# Patient Record
Sex: MALE | Race: BLACK OR AFRICAN AMERICAN | NOT HISPANIC OR LATINO | URBAN - METROPOLITAN AREA
[De-identification: names, ages, dates, MRNs, and addresses within clinical notes are randomized per-mention and may not be internally consistent; named-entity substitution may affect disease eponyms.]

---

## 2020-06-02 ENCOUNTER — OFFICE VISIT (OUTPATIENT)
Dept: URBAN - METROPOLITAN AREA CLINIC 92 | Facility: CLINIC | Age: 60
End: 2020-06-02

## 2020-07-09 ENCOUNTER — OFFICE VISIT (OUTPATIENT)
Dept: URBAN - METROPOLITAN AREA CLINIC 17 | Facility: CLINIC | Age: 60
End: 2020-07-09

## 2020-07-14 ENCOUNTER — CLAIMS CREATED FROM THE CLAIM WINDOW (OUTPATIENT)
Dept: URBAN - METROPOLITAN AREA CLINIC 17 | Facility: CLINIC | Age: 60
End: 2020-07-14
Payer: COMMERCIAL

## 2020-07-14 DIAGNOSIS — D12.6 COLON ADENOMA: ICD-10-CM

## 2020-07-14 DIAGNOSIS — R10.9 ABDOMINAL PAIN, UNSPECIFIED ABDOMINAL LOCATION: ICD-10-CM

## 2020-07-14 DIAGNOSIS — K59.00 CONSTIPATION, UNSPECIFIED CONSTIPATION TYPE: ICD-10-CM

## 2020-07-14 DIAGNOSIS — Z79.01 ANTICOAGULATION ADEQUATE: ICD-10-CM

## 2020-07-14 DIAGNOSIS — K85.90 ACUTE PANCREATITIS, UNSPECIFIED COMPLICATION STATUS, UNSPECIFIED PANCREATITIS TYPE: ICD-10-CM

## 2020-07-14 DIAGNOSIS — Z95.0 PACEMAKER: ICD-10-CM

## 2020-07-14 DIAGNOSIS — I10 ESSENTIAL HYPERTENSION: ICD-10-CM

## 2020-07-14 DIAGNOSIS — Z95.810 CARDIAC DEFIBRILLATOR IN PLACE: ICD-10-CM

## 2020-07-14 PROCEDURE — G8417 CALC BMI ABV UP PARAM F/U: HCPCS | Performed by: INTERNAL MEDICINE

## 2020-07-14 PROCEDURE — G8753 SYS BP > OR = 140: HCPCS | Performed by: INTERNAL MEDICINE

## 2020-07-14 PROCEDURE — 99214 OFFICE O/P EST MOD 30 MIN: CPT | Performed by: INTERNAL MEDICINE

## 2020-07-14 PROCEDURE — 3017F COLORECTAL CA SCREEN DOC REV: CPT | Performed by: INTERNAL MEDICINE

## 2020-07-14 PROCEDURE — G9906 PT RECV TBCO CESS INTERV: HCPCS | Performed by: INTERNAL MEDICINE

## 2020-07-14 PROCEDURE — G8427 DOCREV CUR MEDS BY ELIG CLIN: HCPCS | Performed by: INTERNAL MEDICINE

## 2020-07-14 RX ORDER — RIVAROXABAN 20 MG/1
1 TABLET WITH FOOD TABLET, FILM COATED ORAL ONCE A DAY
Status: ACTIVE | COMMUNITY

## 2020-07-14 RX ORDER — FINASTERIDE 5 MG/1
1 TABLET TABLET, FILM COATED ORAL ONCE A DAY
Status: ACTIVE | COMMUNITY

## 2020-07-14 RX ORDER — OMEPRAZOLE 40 MG/1
1 CAPSULE 30 MINUTES BEFORE MORNING MEAL CAPSULE, DELAYED RELEASE ORAL ONCE A DAY
Qty: 30 | OUTPATIENT
Start: 2020-07-14

## 2020-07-14 RX ORDER — LISINOPRIL 10 MG/1
1 TABLET TABLET ORAL ONCE A DAY
Status: ACTIVE | COMMUNITY

## 2020-07-14 RX ORDER — GABAPENTIN 300 MG/1
1 CAPSULE CAPSULE ORAL ONCE A DAY
Status: ACTIVE | COMMUNITY

## 2020-07-14 RX ORDER — GLIPIZIDE 5 MG/1
1 TABLET 30 MINUTES BEFORE BREAKFAST TABLET ORAL ONCE A DAY
Status: ACTIVE | COMMUNITY

## 2020-07-14 RX ORDER — METOPROLOL TARTRATE 100 MG/1
1 TABLET WITH FOOD TABLET, FILM COATED ORAL TWICE A DAY
Status: ACTIVE | COMMUNITY

## 2020-07-14 RX ORDER — TAMSULOSIN HYDROCHLORIDE 0.4 MG/1
1 CAPSULE CAPSULE ORAL ONCE A DAY
Status: ACTIVE | COMMUNITY

## 2020-07-14 RX ORDER — AMIODARONE HYDROCHLORIDE 200 MG/1
1 TABLET TABLET ORAL ONCE A DAY
Status: ACTIVE | COMMUNITY

## 2020-07-14 NOTE — EXAM-PHYSICAL EXAM
Gen: Alert, oriented, in no distress Heent: no icterus, lips wnl Lungs: bilateral breath sounds CVS: first and second heart sounds Abdomen: full, soft, non tender, small lap scars Ext: no edema Joints: normal joints and symmetry Spine: consistent with age Skin: no rash on exposed areas Neuro: no focal localizing signs

## 2020-07-14 NOTE — HPI-TODAY'S VISIT:
60 yo male who has previously seen multiple providers.  Most recently has seen Dr Riccardo Merlos who did his colonoscopy in 2018. I have reviewed past notes. He has also seen Dr Stefano Martinez. He has a h/o chronic abdominal pain with neg w/u in the past. Has been treated for hep C with SVR achieved per notes w/o evidence of cirrhosis.  He was in the ER 7/10/20 for abdominal pain at [facility] [place]. CT abd pelvis with contrast showed mild inflammation around HOP  and mild thickening in the  duodenum. Lipase wnl 157. CTA abd pelvis showed same with pacer/defibrillator in  with colon demonstrating stool retention. Labs including CBC and CMP wnl except for  . He c/o chronic abdominal pain today, has seen multiple providers both in and out of [facility]. He has had multiple exacerbations.  He has been told to stop smoking but has not stopped.  He states he quit etoh. Pain is usually upper abdominal, cramping, constant "until I take one of these pain pills". He has oxycodone and tramadol which he has been given. Pain also radiates to his back. He has had his GB removed.  He has irregular BMs and has a BM about 5 times a week.

## 2020-07-15 ENCOUNTER — OFFICE VISIT (OUTPATIENT)
Dept: URBAN - METROPOLITAN AREA CLINIC 92 | Facility: CLINIC | Age: 60
End: 2020-07-15
Payer: COMMERCIAL

## 2020-07-15 VITALS
HEIGHT: 68 IN | SYSTOLIC BLOOD PRESSURE: 135 MMHG | TEMPERATURE: 97 F | BODY MASS INDEX: 25.58 KG/M2 | WEIGHT: 168.8 LBS | DIASTOLIC BLOOD PRESSURE: 86 MMHG | HEART RATE: 49 BPM

## 2020-07-15 DIAGNOSIS — Z86.010 HISTORY OF COLON POLYPS: ICD-10-CM

## 2020-07-15 DIAGNOSIS — K74.69 OTHER CIRRHOSIS OF LIVER: ICD-10-CM

## 2020-07-15 DIAGNOSIS — K86.1 OTHER CHRONIC PANCREATITIS: ICD-10-CM

## 2020-07-15 PROCEDURE — 1036F TOBACCO NON-USER: CPT | Performed by: INTERNAL MEDICINE

## 2020-07-15 PROCEDURE — 99213 OFFICE O/P EST LOW 20 MIN: CPT | Performed by: INTERNAL MEDICINE

## 2020-07-15 PROCEDURE — 3017F COLORECTAL CA SCREEN DOC REV: CPT | Performed by: INTERNAL MEDICINE

## 2020-07-15 PROCEDURE — 99203 OFFICE O/P NEW LOW 30 MIN: CPT | Performed by: INTERNAL MEDICINE

## 2020-07-15 PROCEDURE — G9903 PT SCRN TBCO ID AS NON USER: HCPCS | Performed by: INTERNAL MEDICINE

## 2020-07-15 PROCEDURE — G8417 CALC BMI ABV UP PARAM F/U: HCPCS | Performed by: INTERNAL MEDICINE

## 2020-07-15 RX ORDER — LISINOPRIL 10 MG/1
1 TABLET TABLET ORAL ONCE A DAY
Status: ACTIVE | COMMUNITY

## 2020-07-15 RX ORDER — OMEPRAZOLE 40 MG/1
1 CAPSULE 30 MINUTES BEFORE MORNING MEAL CAPSULE, DELAYED RELEASE ORAL ONCE A DAY
Qty: 30 | Status: ACTIVE | COMMUNITY
Start: 2020-07-14

## 2020-07-15 RX ORDER — PANCRELIPASE 36000; 180000; 114000 [USP'U]/1; [USP'U]/1; [USP'U]/1
AS DIRECTED CAPSULE, DELAYED RELEASE PELLETS ORAL
Qty: 240 | Refills: 11 | OUTPATIENT
Start: 2020-07-15 | End: 2021-07-10

## 2020-07-15 RX ORDER — GLIPIZIDE 5 MG/1
1 TABLET 30 MINUTES BEFORE BREAKFAST TABLET ORAL ONCE A DAY
Status: ACTIVE | COMMUNITY

## 2020-07-15 RX ORDER — AMIODARONE HYDROCHLORIDE 200 MG/1
1 TABLET TABLET ORAL ONCE A DAY
Status: ACTIVE | COMMUNITY

## 2020-07-15 RX ORDER — RIVAROXABAN 20 MG/1
1 TABLET WITH FOOD TABLET, FILM COATED ORAL ONCE A DAY
Status: ACTIVE | COMMUNITY

## 2020-07-15 RX ORDER — GABAPENTIN 300 MG/1
1 CAPSULE CAPSULE ORAL ONCE A DAY
Status: ACTIVE | COMMUNITY

## 2020-07-15 RX ORDER — TAMSULOSIN HYDROCHLORIDE 0.4 MG/1
1 CAPSULE CAPSULE ORAL ONCE A DAY
Status: ACTIVE | COMMUNITY

## 2020-07-15 RX ORDER — FINASTERIDE 5 MG/1
1 TABLET TABLET, FILM COATED ORAL ONCE A DAY
Status: ACTIVE | COMMUNITY

## 2020-07-15 RX ORDER — METOPROLOL TARTRATE 100 MG/1
1 TABLET WITH FOOD TABLET, FILM COATED ORAL TWICE A DAY
Status: ACTIVE | COMMUNITY

## 2020-07-15 NOTE — HPI-TODAY'S VISIT:
This is a 59-year-old -American male presents today for consultation.  He has been under the care of a another gastroenterologist over the last few weeks.  He is here for a second opinion.  He has a history of recurrent acute pancreatitis over the last year.  Most recently had recurrent symptoms approximately 2 weeks ago.  He has epigastric kwesi pain radiating to the back with some nausea.  He had laboratory work CT scan and a follow-up MRI which was all notable for acute pancreatitis per his report.  He continues to have abdominal pain which is longer than typical.  He does note that he did drink wine coolers for approximately 1 week prior to this episode he denies prior alcohol use for his other attacks.. He reports a history of cirrhosis as a reason for why he quit drinking.  I have no records of his liver work-up.

## 2020-07-15 NOTE — PHYSICAL EXAM GASTROINTESTINAL
Abdomen , soft, tender in epigastrc, nondistended , no guarding or rigidity , no masses palpable , normal bowel sounds , Liver and Spleen , no hepatomegaly present , no hepatosplenomegaly , liver nontender , spleen not palpable

## 2020-07-17 ENCOUNTER — ERX REFILL RESPONSE (OUTPATIENT)
Age: 60
End: 2020-07-17

## 2020-07-17 RX ORDER — METOCLOPRAMIDE 5 MG/1
TAKE 1 TABLET BY MOUTH THREE TIMES DAILY 30 MINUTES BEFORE MEALS TABLET ORAL
Qty: 60 | Refills: 1

## 2020-07-17 RX ORDER — METOCLOPRAMIDE 5 MG/1
TAKE 1 TABLET BY MOUTH THREE TIMES DAILY 30 MINUTES BEFORE MEALS TABLET ORAL
Qty: 60 | Refills: 0

## 2020-07-29 ENCOUNTER — OFFICE VISIT (OUTPATIENT)
Dept: URBAN - METROPOLITAN AREA CLINIC 92 | Facility: CLINIC | Age: 60
End: 2020-07-29

## 2020-07-31 ENCOUNTER — OFFICE VISIT (OUTPATIENT)
Dept: URBAN - METROPOLITAN AREA CLINIC 92 | Facility: CLINIC | Age: 60
End: 2020-07-31
Payer: COMMERCIAL

## 2020-07-31 VITALS
BODY MASS INDEX: 26.83 KG/M2 | HEIGHT: 68 IN | HEART RATE: 87 BPM | WEIGHT: 177 LBS | SYSTOLIC BLOOD PRESSURE: 149 MMHG | DIASTOLIC BLOOD PRESSURE: 95 MMHG | TEMPERATURE: 95.8 F

## 2020-07-31 DIAGNOSIS — Z86.010 HISTORY OF COLON POLYPS: ICD-10-CM

## 2020-07-31 DIAGNOSIS — B19.20 HCV (HEPATITIS C VIRUS): ICD-10-CM

## 2020-07-31 DIAGNOSIS — K85.90 PANCREATITIS: ICD-10-CM

## 2020-07-31 DIAGNOSIS — K74.60 CIRRHOSIS: ICD-10-CM

## 2020-07-31 PROCEDURE — G8427 DOCREV CUR MEDS BY ELIG CLIN: HCPCS | Performed by: INTERNAL MEDICINE

## 2020-07-31 PROCEDURE — 99213 OFFICE O/P EST LOW 20 MIN: CPT | Performed by: INTERNAL MEDICINE

## 2020-07-31 PROCEDURE — G8417 CALC BMI ABV UP PARAM F/U: HCPCS | Performed by: INTERNAL MEDICINE

## 2020-07-31 PROCEDURE — 3017F COLORECTAL CA SCREEN DOC REV: CPT | Performed by: INTERNAL MEDICINE

## 2020-07-31 PROCEDURE — G9906 PT RECV TBCO CESS INTERV: HCPCS | Performed by: INTERNAL MEDICINE

## 2020-07-31 RX ORDER — TAMSULOSIN HYDROCHLORIDE 0.4 MG/1
1 CAPSULE CAPSULE ORAL ONCE A DAY
Status: DISCONTINUED | COMMUNITY

## 2020-07-31 RX ORDER — GLIPIZIDE 5 MG/1
1 TABLET 30 MINUTES BEFORE BREAKFAST TABLET ORAL ONCE A DAY
Status: ACTIVE | COMMUNITY

## 2020-07-31 RX ORDER — METOPROLOL TARTRATE 100 MG/1
1 TABLET WITH FOOD TABLET, FILM COATED ORAL TWICE A DAY
Status: ACTIVE | COMMUNITY

## 2020-07-31 RX ORDER — RIVAROXABAN 20 MG/1
1 TABLET WITH FOOD TABLET, FILM COATED ORAL ONCE A DAY
Status: ACTIVE | COMMUNITY

## 2020-07-31 RX ORDER — LISINOPRIL 10 MG/1
1 TABLET TABLET ORAL ONCE A DAY
Status: ACTIVE | COMMUNITY

## 2020-07-31 RX ORDER — PANCRELIPASE 36000; 180000; 114000 [USP'U]/1; [USP'U]/1; [USP'U]/1
AS DIRECTED CAPSULE, DELAYED RELEASE PELLETS ORAL
Qty: 240 | Refills: 11 | Status: ACTIVE | COMMUNITY
Start: 2020-07-15 | End: 2021-07-10

## 2020-07-31 RX ORDER — OMEPRAZOLE 40 MG/1
1 CAPSULE 30 MINUTES BEFORE MORNING MEAL CAPSULE, DELAYED RELEASE ORAL ONCE A DAY
Qty: 30 | Status: DISCONTINUED | COMMUNITY
Start: 2020-07-14

## 2020-07-31 RX ORDER — GABAPENTIN 300 MG/1
1 CAPSULE CAPSULE ORAL ONCE A DAY
Status: DISCONTINUED | COMMUNITY

## 2020-07-31 RX ORDER — AMIODARONE HYDROCHLORIDE 200 MG/1
1 TABLET TABLET ORAL ONCE A DAY
Status: DISCONTINUED | COMMUNITY

## 2020-07-31 RX ORDER — FINASTERIDE 5 MG/1
1 TABLET TABLET, FILM COATED ORAL ONCE A DAY
Status: DISCONTINUED | COMMUNITY

## 2020-07-31 RX ORDER — METOCLOPRAMIDE 5 MG/1
TAKE 1 TABLET BY MOUTH THREE TIMES DAILY 30 MINUTES BEFORE MEALS TABLET ORAL
Qty: 60 | Refills: 0 | Status: DISCONTINUED | COMMUNITY

## 2020-07-31 NOTE — PHYSICAL EXAM SKIN:
no rashes , no suspicious lesions , no areas of discoloration , no jaundice present , good turgor , no masses , no tenderness on palpation no

## 2020-07-31 NOTE — HPI-TODAY'S VISIT:
This is a 59-year-old -American male presents today for follow-up.  He notes improvement in his abdominal symptoms over the last few weeks.  He has started to take Creon.  He has avoided alcohol.  He has changed his diet to avoid greasy fatty foods.  His bowel movements are normal.  There is no nausea or vomiting.  I have obtained some of his prior medical records which is most notable for his recent laboratory work which was done on June 25, 2020 and notable for a platelet count of 180 liver enzymes were normal PT INR was 1.6 a hep C RNA was pending at the time hep B surface antigen body was negative hepatitis A total antibody was positive

## 2020-08-14 ENCOUNTER — OFFICE VISIT (OUTPATIENT)
Dept: URBAN - METROPOLITAN AREA CLINIC 92 | Facility: CLINIC | Age: 60
End: 2020-08-14
Payer: COMMERCIAL

## 2020-08-14 VITALS
HEART RATE: 83 BPM | WEIGHT: 174 LBS | SYSTOLIC BLOOD PRESSURE: 125 MMHG | DIASTOLIC BLOOD PRESSURE: 83 MMHG | TEMPERATURE: 94.9 F | HEIGHT: 68 IN | BODY MASS INDEX: 26.37 KG/M2

## 2020-08-14 DIAGNOSIS — K86.1 CHRONIC PANCREATITIS: ICD-10-CM

## 2020-08-14 DIAGNOSIS — B17.10 HEPATITIS C: ICD-10-CM

## 2020-08-14 DIAGNOSIS — R10.13 EPIGASTRIC ABDOMINAL PAIN: ICD-10-CM

## 2020-08-14 PROCEDURE — 1036F TOBACCO NON-USER: CPT | Performed by: INTERNAL MEDICINE

## 2020-08-14 PROCEDURE — 99204 OFFICE O/P NEW MOD 45 MIN: CPT | Performed by: INTERNAL MEDICINE

## 2020-08-14 PROCEDURE — 3017F COLORECTAL CA SCREEN DOC REV: CPT | Performed by: INTERNAL MEDICINE

## 2020-08-14 PROCEDURE — G8427 DOCREV CUR MEDS BY ELIG CLIN: HCPCS | Performed by: INTERNAL MEDICINE

## 2020-08-14 RX ORDER — METOPROLOL TARTRATE 100 MG/1
1 TABLET WITH FOOD TABLET, FILM COATED ORAL TWICE A DAY
Status: ACTIVE | COMMUNITY

## 2020-08-14 RX ORDER — RIVAROXABAN 20 MG/1
1 TABLET WITH FOOD TABLET, FILM COATED ORAL ONCE A DAY
Status: ACTIVE | COMMUNITY

## 2020-08-14 RX ORDER — LISINOPRIL 10 MG/1
1 TABLET TABLET ORAL ONCE A DAY
Status: ACTIVE | COMMUNITY

## 2020-08-14 RX ORDER — GLIPIZIDE 5 MG/1
1 TABLET 30 MINUTES BEFORE BREAKFAST TABLET ORAL ONCE A DAY
Status: ACTIVE | COMMUNITY

## 2020-08-14 RX ORDER — PANCRELIPASE 36000; 180000; 114000 [USP'U]/1; [USP'U]/1; [USP'U]/1
AS DIRECTED CAPSULE, DELAYED RELEASE PELLETS ORAL
Qty: 240 | Refills: 11 | Status: ACTIVE | COMMUNITY
Start: 2020-07-15 | End: 2021-07-10

## 2020-08-14 NOTE — HPI-TODAY'S VISIT:
This is a 40-year-old male who presents for an urgent visit for abdominal pain  He is followed by Dr. Elder Pérez and Dr. Yefri Szymanski, [facility].  He has a history of HCV and possible cirrhosis.  He has significant cardiac history including HOCM and s/p PPM placement.  He underwent treatment of HCV in 2017 achieving SVR.  He has chronic pancreatitis and was recently started on Creon 36 two tablets with meal. he has intermittent epigastric / periumbilical abdominal pain that can last up to days in duration.  He reports nausea without vomiting.  He has alternating bowel habits between constipation and diarrhea but denies any hematemesis, melena, or hematochezia.  He denies any recent alcohol use.  Abdominal ultrasound on 11/18/2019 showed echogenic liver with 0.9 cm x 0.7 cm hemangioma in the right lobe of the liver.  Gastric emptying study on 02/05/2020 revealed normal gastric emptying.  MRI/MRCP on 07/21/2020 showed normal liver morphology without HCC.  There patent hepatic vasculature without portal hypertension.

## 2020-08-16 PROBLEM — 399432003: Status: ACTIVE | Noted: 2020-08-16

## 2020-08-16 PROBLEM — 14760008: Status: ACTIVE | Noted: 2020-08-16

## 2020-08-16 PROBLEM — 441509002: Status: ACTIVE | Noted: 2020-07-14

## 2020-08-16 PROBLEM — 59621000: Status: ACTIVE | Noted: 2020-07-14

## 2020-08-16 PROBLEM — 441769002: Status: ACTIVE | Noted: 2020-07-14

## 2020-08-16 PROBLEM — 197456007: Status: ACTIVE | Noted: 2020-08-16

## 2020-09-09 ENCOUNTER — OFFICE VISIT (OUTPATIENT)
Dept: URBAN - METROPOLITAN AREA MEDICAL CENTER 12 | Facility: MEDICAL CENTER | Age: 60
End: 2020-09-09
Payer: COMMERCIAL

## 2020-09-09 DIAGNOSIS — K44.9 DIAPHRAGMATIC HERNIA: ICD-10-CM

## 2020-09-09 DIAGNOSIS — K31.89 ACQUIRED DEFORMITY OF DUODENUM: ICD-10-CM

## 2020-09-09 PROCEDURE — 43239 EGD BIOPSY SINGLE/MULTIPLE: CPT | Performed by: INTERNAL MEDICINE

## 2020-09-09 RX ORDER — PANCRELIPASE 36000; 180000; 114000 [USP'U]/1; [USP'U]/1; [USP'U]/1
AS DIRECTED CAPSULE, DELAYED RELEASE PELLETS ORAL
Qty: 240 | Refills: 11 | Status: ACTIVE | COMMUNITY
Start: 2020-07-15 | End: 2021-07-10

## 2020-09-09 RX ORDER — METOPROLOL TARTRATE 100 MG/1
1 TABLET WITH FOOD TABLET, FILM COATED ORAL TWICE A DAY
Status: ACTIVE | COMMUNITY

## 2020-09-09 RX ORDER — LISINOPRIL 10 MG/1
1 TABLET TABLET ORAL ONCE A DAY
Status: ACTIVE | COMMUNITY

## 2020-09-09 RX ORDER — GLIPIZIDE 5 MG/1
1 TABLET 30 MINUTES BEFORE BREAKFAST TABLET ORAL ONCE A DAY
Status: ACTIVE | COMMUNITY

## 2020-09-09 RX ORDER — RIVAROXABAN 20 MG/1
1 TABLET WITH FOOD TABLET, FILM COATED ORAL ONCE A DAY
Status: ACTIVE | COMMUNITY

## 2020-09-30 ENCOUNTER — OFFICE VISIT (OUTPATIENT)
Dept: URBAN - METROPOLITAN AREA TELEHEALTH 2 | Facility: TELEHEALTH | Age: 60
End: 2020-09-30
Payer: COMMERCIAL

## 2020-09-30 DIAGNOSIS — B17.10 HEPATITIS C: ICD-10-CM

## 2020-09-30 DIAGNOSIS — Z86.010 HISTORY OF COLON POLYPS: ICD-10-CM

## 2020-09-30 DIAGNOSIS — R10.13 EPIGASTRIC ABDOMINAL PAIN: ICD-10-CM

## 2020-09-30 DIAGNOSIS — K86.1 CHRONIC PANCREATITIS: ICD-10-CM

## 2020-09-30 PROCEDURE — 99213 OFFICE O/P EST LOW 20 MIN: CPT | Performed by: INTERNAL MEDICINE

## 2020-09-30 PROCEDURE — 1036F TOBACCO NON-USER: CPT | Performed by: INTERNAL MEDICINE

## 2020-09-30 PROCEDURE — 3017F COLORECTAL CA SCREEN DOC REV: CPT | Performed by: INTERNAL MEDICINE

## 2020-09-30 PROCEDURE — G8427 DOCREV CUR MEDS BY ELIG CLIN: HCPCS | Performed by: INTERNAL MEDICINE

## 2020-09-30 PROCEDURE — G9903 PT SCRN TBCO ID AS NON USER: HCPCS | Performed by: INTERNAL MEDICINE

## 2020-09-30 PROCEDURE — G8417 CALC BMI ABV UP PARAM F/U: HCPCS | Performed by: INTERNAL MEDICINE

## 2020-09-30 RX ORDER — LISINOPRIL 10 MG/1
1 TABLET TABLET ORAL ONCE A DAY
Status: ACTIVE | COMMUNITY

## 2020-09-30 RX ORDER — METOPROLOL TARTRATE 100 MG/1
1 TABLET WITH FOOD TABLET, FILM COATED ORAL TWICE A DAY
Status: ACTIVE | COMMUNITY

## 2020-09-30 RX ORDER — PANCRELIPASE 36000; 180000; 114000 [USP'U]/1; [USP'U]/1; [USP'U]/1
AS DIRECTED CAPSULE, DELAYED RELEASE PELLETS ORAL
Qty: 240 | Refills: 11 | Status: ACTIVE | COMMUNITY
Start: 2020-07-15 | End: 2021-07-10

## 2020-09-30 RX ORDER — GLIPIZIDE 5 MG/1
1 TABLET 30 MINUTES BEFORE BREAKFAST TABLET ORAL ONCE A DAY
Status: ACTIVE | COMMUNITY

## 2020-09-30 RX ORDER — RIVAROXABAN 20 MG/1
1 TABLET WITH FOOD TABLET, FILM COATED ORAL ONCE A DAY
Status: ACTIVE | COMMUNITY

## 2020-09-30 NOTE — HPI-TODAY'S VISIT:
This is a 61-year-old -American male who presents today for follow-up.  He notes that he continues to have vague abdominal pains although he notes that they are quite severe but migratory.  There is no association with oral intake.  He had an endoscopy last month which showed mild gastritis with no H. pylori bacteria.  On review of his most recent imaging he had an MRI in July 2020 which showed normal morphology of the liver no hepatocellular carcinoma.  There is patent hepatic vasculature without stigmata of portal hypertension.  Despite which he feels that his belly is swelling.  His weight is stable.

## 2020-10-08 ENCOUNTER — OFFICE VISIT (OUTPATIENT)
Dept: URBAN - METROPOLITAN AREA CLINIC 91 | Facility: CLINIC | Age: 60
End: 2020-10-08

## 2020-10-23 ENCOUNTER — OFFICE VISIT (OUTPATIENT)
Dept: URBAN - METROPOLITAN AREA CLINIC 92 | Facility: CLINIC | Age: 60
End: 2020-10-23

## 2020-10-23 RX ORDER — METOPROLOL TARTRATE 100 MG/1
1 TABLET WITH FOOD TABLET, FILM COATED ORAL TWICE A DAY
Status: ACTIVE | COMMUNITY

## 2020-10-23 RX ORDER — PANCRELIPASE 36000; 180000; 114000 [USP'U]/1; [USP'U]/1; [USP'U]/1
AS DIRECTED CAPSULE, DELAYED RELEASE PELLETS ORAL
Qty: 240 | Refills: 11 | Status: ACTIVE | COMMUNITY
Start: 2020-07-15 | End: 2021-07-10

## 2020-10-23 RX ORDER — GLIPIZIDE 5 MG/1
1 TABLET 30 MINUTES BEFORE BREAKFAST TABLET ORAL ONCE A DAY
Status: ACTIVE | COMMUNITY

## 2020-10-23 RX ORDER — LISINOPRIL 10 MG/1
1 TABLET TABLET ORAL ONCE A DAY
Status: ACTIVE | COMMUNITY

## 2020-10-23 RX ORDER — RIVAROXABAN 20 MG/1
1 TABLET WITH FOOD TABLET, FILM COATED ORAL ONCE A DAY
Status: ACTIVE | COMMUNITY

## 2020-12-20 ENCOUNTER — ERX REFILL RESPONSE (OUTPATIENT)
Age: 60
End: 2020-12-20

## 2020-12-20 RX ORDER — PANTOPRAZOLE 40 MG/1
TAKE 1 TABLET BY MOUTH ONCE DAILY FOR 90 DAYS TABLET, DELAYED RELEASE ORAL
Qty: 90 | Refills: 0

## 2021-01-22 ENCOUNTER — OFFICE VISIT (OUTPATIENT)
Dept: URBAN - METROPOLITAN AREA CLINIC 92 | Facility: CLINIC | Age: 61
End: 2021-01-22
Payer: COMMERCIAL

## 2021-01-22 DIAGNOSIS — K86.1 CHRONIC PANCREATITIS: ICD-10-CM

## 2021-01-22 DIAGNOSIS — R10.9 CHRONIC ABDOMINAL PAIN: ICD-10-CM

## 2021-01-22 DIAGNOSIS — K21.9 GERD (GASTROESOPHAGEAL REFLUX DISEASE): ICD-10-CM

## 2021-01-22 DIAGNOSIS — F17.200 SMOKER: ICD-10-CM

## 2021-01-22 PROBLEM — 77176002 SMOKER: Status: ACTIVE | Noted: 2021-01-22

## 2021-01-22 PROBLEM — 235595009 GASTROESOPHAGEAL REFLUX DISEASE: Status: ACTIVE | Noted: 2021-01-22

## 2021-01-22 PROCEDURE — G8417 CALC BMI ABV UP PARAM F/U: HCPCS | Performed by: INTERNAL MEDICINE

## 2021-01-22 PROCEDURE — 1036F TOBACCO NON-USER: CPT | Performed by: INTERNAL MEDICINE

## 2021-01-22 PROCEDURE — 3017F COLORECTAL CA SCREEN DOC REV: CPT | Performed by: INTERNAL MEDICINE

## 2021-01-22 PROCEDURE — G8482 FLU IMMUNIZE ORDER/ADMIN: HCPCS | Performed by: INTERNAL MEDICINE

## 2021-01-22 PROCEDURE — G8427 DOCREV CUR MEDS BY ELIG CLIN: HCPCS | Performed by: INTERNAL MEDICINE

## 2021-01-22 PROCEDURE — 99213 OFFICE O/P EST LOW 20 MIN: CPT | Performed by: INTERNAL MEDICINE

## 2021-01-22 RX ORDER — METOPROLOL TARTRATE 100 MG/1
1 TABLET WITH FOOD TABLET, FILM COATED ORAL TWICE A DAY
Status: ACTIVE | COMMUNITY

## 2021-01-22 RX ORDER — LISINOPRIL 10 MG/1
1 TABLET TABLET ORAL ONCE A DAY
Status: ACTIVE | COMMUNITY

## 2021-01-22 RX ORDER — PANCRELIPASE 36000; 180000; 114000 [USP'U]/1; [USP'U]/1; [USP'U]/1
AS DIRECTED CAPSULE, DELAYED RELEASE PELLETS ORAL
Qty: 240 | Refills: 11 | Status: ACTIVE | COMMUNITY
Start: 2020-07-15 | End: 2021-07-10

## 2021-01-22 RX ORDER — PANTOPRAZOLE 40 MG/1
TAKE 1 TABLET BY MOUTH ONCE DAILY FOR 90 DAYS TABLET, DELAYED RELEASE ORAL
Qty: 90 | Refills: 0 | Status: ACTIVE | COMMUNITY

## 2021-01-22 RX ORDER — GLIPIZIDE 5 MG/1
1 TABLET 30 MINUTES BEFORE BREAKFAST TABLET ORAL ONCE A DAY
Status: ACTIVE | COMMUNITY

## 2021-01-22 RX ORDER — RIVAROXABAN 20 MG/1
1 TABLET WITH FOOD TABLET, FILM COATED ORAL ONCE A DAY
Status: ACTIVE | COMMUNITY

## 2021-01-22 NOTE — PHYSICAL EXAM GASTROINTESTINAL
Abdomen , soft, mild epigastric tender, nondistended , no guarding or rigidity , no masses palpable , normal bowel sounds , Liver and Spleen , no hepatomegaly present , no hepatosplenomegaly , liver nontender , spleen not palpable

## 2021-01-22 NOTE — HPI-TODAY'S VISIT:
This is a 60-year-old -American male presents today for follow-up.  He notes that after our last encounter.  He did have some improvement overall in his symptoms.  Although over the last 3 weeks has had increasing epigastric type abdominal pain.  He states that this feels like a hunger pain.  There is no sharp and stabbing quality.  It is there predominantly throughout the day worse after eating.  He notes that he has had a worse diet over the last few weeks.  He continues to smoke.  There are no fevers or chills.  His bowel movements are normal.  He is compliant with his Creon and PPI.  Cough

## 2021-01-28 ENCOUNTER — LAB OUTSIDE AN ENCOUNTER (OUTPATIENT)
Dept: URBAN - METROPOLITAN AREA CLINIC 92 | Facility: CLINIC | Age: 61
End: 2021-01-28

## 2021-01-28 LAB
CREATININE POC: 1
PERFORMING LAB: (no result)

## 2021-03-08 ENCOUNTER — OFFICE VISIT (OUTPATIENT)
Dept: URBAN - METROPOLITAN AREA CLINIC 92 | Facility: CLINIC | Age: 61
End: 2021-03-08

## 2021-03-29 ENCOUNTER — OFFICE VISIT (OUTPATIENT)
Dept: URBAN - METROPOLITAN AREA CLINIC 92 | Facility: CLINIC | Age: 61
End: 2021-03-29
Payer: COMMERCIAL

## 2021-03-29 ENCOUNTER — LAB OUTSIDE AN ENCOUNTER (OUTPATIENT)
Dept: URBAN - METROPOLITAN AREA CLINIC 92 | Facility: CLINIC | Age: 61
End: 2021-03-29

## 2021-03-29 VITALS
WEIGHT: 180 LBS | HEART RATE: 80 BPM | HEIGHT: 68 IN | SYSTOLIC BLOOD PRESSURE: 141 MMHG | DIASTOLIC BLOOD PRESSURE: 93 MMHG | BODY MASS INDEX: 27.28 KG/M2 | TEMPERATURE: 95.1 F

## 2021-03-29 DIAGNOSIS — R10.13 EPIGASTRIC ABDOMINAL PAIN: ICD-10-CM

## 2021-03-29 DIAGNOSIS — D12.6 TUBULAR ADENOMA OF COLON: ICD-10-CM

## 2021-03-29 PROCEDURE — 99214 OFFICE O/P EST MOD 30 MIN: CPT | Performed by: INTERNAL MEDICINE

## 2021-03-29 RX ORDER — GLIPIZIDE 5 MG/1
1 TABLET 30 MINUTES BEFORE BREAKFAST TABLET ORAL ONCE A DAY
Status: ACTIVE | COMMUNITY

## 2021-03-29 RX ORDER — RIVAROXABAN 20 MG/1
1 TABLET WITH FOOD TABLET, FILM COATED ORAL ONCE A DAY
Status: ACTIVE | COMMUNITY

## 2021-03-29 RX ORDER — LISINOPRIL 10 MG/1
1 TABLET TABLET ORAL ONCE A DAY
Status: ACTIVE | COMMUNITY

## 2021-03-29 RX ORDER — PANTOPRAZOLE 40 MG/1
TAKE 1 TABLET BY MOUTH ONCE DAILY FOR 90 DAYS TABLET, DELAYED RELEASE ORAL
Qty: 90 | Refills: 0 | Status: ACTIVE | COMMUNITY

## 2021-03-29 RX ORDER — PANCRELIPASE 36000; 180000; 114000 [USP'U]/1; [USP'U]/1; [USP'U]/1
AS DIRECTED CAPSULE, DELAYED RELEASE PELLETS ORAL
Qty: 240 | Refills: 11 | Status: ACTIVE | COMMUNITY
Start: 2020-07-15 | End: 2021-07-10

## 2021-03-29 RX ORDER — METOPROLOL TARTRATE 100 MG/1
1 TABLET WITH FOOD TABLET, FILM COATED ORAL TWICE A DAY
Status: ACTIVE | COMMUNITY

## 2021-03-29 NOTE — HPI-TODAY'S VISIT:
Pt with epigastric discomfort. Intermittent. Dull. No increasing or decreasing factos. Bloating and alternating stools. Denies wt loss, GI bleeding or n/v. Hx of pancreatitis. On creon. S/p coreen. No recent ETOH.  Hx of HCV cirrhosis. Tx for HCV and states SVR.  EGD 2020 with small hiatal hernia and gastritis.  COlonoscopy 2018 with 1 cm cecal polyp.

## 2021-04-05 ENCOUNTER — OFFICE VISIT (OUTPATIENT)
Dept: URBAN - METROPOLITAN AREA CLINIC 92 | Facility: CLINIC | Age: 61
End: 2021-04-05

## 2021-04-09 ENCOUNTER — TELEPHONE ENCOUNTER (OUTPATIENT)
Dept: URBAN - METROPOLITAN AREA CLINIC 92 | Facility: CLINIC | Age: 61
End: 2021-04-09

## 2021-04-15 ENCOUNTER — ERX REFILL RESPONSE (OUTPATIENT)
Dept: URBAN - METROPOLITAN AREA CLINIC 105 | Facility: CLINIC | Age: 61
End: 2021-04-15

## 2021-04-15 RX ORDER — PANTOPRAZOLE 40 MG/1
TAKE 1 TABLET BY MOUTH ONCE DAILY FOR 90 DAYS TABLET, DELAYED RELEASE ORAL
Qty: 90 | Refills: 0

## 2021-04-16 ENCOUNTER — LAB OUTSIDE AN ENCOUNTER (OUTPATIENT)
Dept: URBAN - METROPOLITAN AREA CLINIC 13 | Facility: CLINIC | Age: 61
End: 2021-04-16

## 2021-04-20 ENCOUNTER — TELEPHONE ENCOUNTER (OUTPATIENT)
Dept: URBAN - METROPOLITAN AREA CLINIC 92 | Facility: CLINIC | Age: 61
End: 2021-04-20

## 2021-04-20 RX ORDER — SUCRALFATE 1 G/10ML
10 ML ON AN EMPTY STOMACH SUSPENSION ORAL TWICE A DAY
Qty: 200 ML | Refills: 0 | OUTPATIENT
Start: 2021-04-22 | End: 2021-05-02

## 2021-04-22 ENCOUNTER — TELEPHONE ENCOUNTER (OUTPATIENT)
Dept: URBAN - METROPOLITAN AREA CLINIC 92 | Facility: CLINIC | Age: 61
End: 2021-04-22

## 2021-04-30 ENCOUNTER — OFFICE VISIT (OUTPATIENT)
Dept: URBAN - METROPOLITAN AREA CLINIC 92 | Facility: CLINIC | Age: 61
End: 2021-04-30

## 2021-05-10 ENCOUNTER — OFFICE VISIT (OUTPATIENT)
Dept: URBAN - METROPOLITAN AREA MEDICAL CENTER 12 | Facility: MEDICAL CENTER | Age: 61
End: 2021-05-10

## 2021-05-26 ENCOUNTER — TELEPHONE ENCOUNTER (OUTPATIENT)
Dept: URBAN - METROPOLITAN AREA CLINIC 92 | Facility: CLINIC | Age: 61
End: 2021-05-26

## 2021-06-11 ENCOUNTER — TELEPHONE ENCOUNTER (OUTPATIENT)
Dept: URBAN - METROPOLITAN AREA CLINIC 92 | Facility: CLINIC | Age: 61
End: 2021-06-11

## 2021-06-30 ENCOUNTER — OFFICE VISIT (OUTPATIENT)
Dept: URBAN - METROPOLITAN AREA MEDICAL CENTER 12 | Facility: MEDICAL CENTER | Age: 61
End: 2021-06-30
Payer: COMMERCIAL

## 2021-06-30 DIAGNOSIS — D12.2 ADENOMA OF ASCENDING COLON: ICD-10-CM

## 2021-06-30 DIAGNOSIS — R10.13 ABDOMINAL DISCOMFORT, EPIGASTRIC: ICD-10-CM

## 2021-06-30 DIAGNOSIS — K31.89 ACQUIRED DEFORMITY OF DUODENUM: ICD-10-CM

## 2021-06-30 DIAGNOSIS — Z86.010 H/O ADENOMATOUS POLYP OF COLON: ICD-10-CM

## 2021-06-30 PROCEDURE — 43239 EGD BIOPSY SINGLE/MULTIPLE: CPT | Performed by: INTERNAL MEDICINE

## 2021-06-30 PROCEDURE — 45380 COLONOSCOPY AND BIOPSY: CPT | Performed by: INTERNAL MEDICINE

## 2021-06-30 RX ORDER — METOPROLOL TARTRATE 100 MG/1
1 TABLET WITH FOOD TABLET, FILM COATED ORAL TWICE A DAY
Status: ACTIVE | COMMUNITY

## 2021-06-30 RX ORDER — LISINOPRIL 10 MG/1
1 TABLET TABLET ORAL ONCE A DAY
Status: ACTIVE | COMMUNITY

## 2021-06-30 RX ORDER — GLIPIZIDE 5 MG/1
1 TABLET 30 MINUTES BEFORE BREAKFAST TABLET ORAL ONCE A DAY
Status: ACTIVE | COMMUNITY

## 2021-06-30 RX ORDER — PANCRELIPASE 36000; 180000; 114000 [USP'U]/1; [USP'U]/1; [USP'U]/1
AS DIRECTED CAPSULE, DELAYED RELEASE PELLETS ORAL
Qty: 240 | Refills: 11 | Status: ACTIVE | COMMUNITY
Start: 2020-07-15 | End: 2021-07-10

## 2021-06-30 RX ORDER — PANTOPRAZOLE 40 MG/1
TAKE 1 TABLET BY MOUTH ONCE DAILY FOR 90 DAYS TABLET, DELAYED RELEASE ORAL
Qty: 90 | Refills: 0 | Status: ACTIVE | COMMUNITY

## 2021-06-30 RX ORDER — RIVAROXABAN 20 MG/1
1 TABLET WITH FOOD TABLET, FILM COATED ORAL ONCE A DAY
Status: ACTIVE | COMMUNITY

## 2021-07-01 ENCOUNTER — TELEPHONE ENCOUNTER (OUTPATIENT)
Dept: URBAN - METROPOLITAN AREA CLINIC 92 | Facility: CLINIC | Age: 61
End: 2021-07-01

## 2021-07-08 ENCOUNTER — TELEPHONE ENCOUNTER (OUTPATIENT)
Dept: URBAN - METROPOLITAN AREA CLINIC 92 | Facility: CLINIC | Age: 61
End: 2021-07-08

## 2021-07-15 ENCOUNTER — ERX REFILL RESPONSE (OUTPATIENT)
Dept: URBAN - METROPOLITAN AREA CLINIC 105 | Facility: CLINIC | Age: 61
End: 2021-07-15

## 2021-07-15 RX ORDER — PANTOPRAZOLE 40 MG/1
TAKE 1 TABLET BY MOUTH ONCE DAILY FOR 90 DAYS TABLET, DELAYED RELEASE ORAL
Qty: 90 | Refills: 0 | OUTPATIENT

## 2021-07-15 RX ORDER — PANTOPRAZOLE 40 MG/1
TAKE 1 TABLET BY MOUTH ONCE DAILY FOR 90 DAYS TABLET, DELAYED RELEASE ORAL
Qty: 90 TABLET | Refills: 1 | OUTPATIENT

## 2021-07-20 ENCOUNTER — ERX REFILL RESPONSE (OUTPATIENT)
Dept: URBAN - METROPOLITAN AREA CLINIC 105 | Facility: CLINIC | Age: 61
End: 2021-07-20

## 2021-07-20 RX ORDER — PANTOPRAZOLE 40 MG/1
TAKE 1 TABLET BY MOUTH ONCE DAILY FOR 90 DAYS TABLET, DELAYED RELEASE ORAL
Qty: 90 TABLET | Refills: 1 | OUTPATIENT

## 2021-07-21 ENCOUNTER — OFFICE VISIT (OUTPATIENT)
Dept: URBAN - METROPOLITAN AREA TELEHEALTH 2 | Facility: TELEHEALTH | Age: 61
End: 2021-07-21
Payer: COMMERCIAL

## 2021-07-21 DIAGNOSIS — D12.6 TUBULAR ADENOMA OF COLON: ICD-10-CM

## 2021-07-21 DIAGNOSIS — R10.13 EPIGASTRIC ABDOMINAL PAIN: ICD-10-CM

## 2021-07-21 DIAGNOSIS — R63.4 WEIGHT LOSS: ICD-10-CM

## 2021-07-21 PROCEDURE — 99442 PHONE E/M BY PHYS 11-20 MIN: CPT | Performed by: INTERNAL MEDICINE

## 2021-07-21 RX ORDER — PANTOPRAZOLE 40 MG/1
TAKE 1 TABLET BY MOUTH ONCE DAILY FOR 90 DAYS TABLET, DELAYED RELEASE ORAL
Qty: 90 TABLET | Refills: 1 | Status: ACTIVE | COMMUNITY

## 2021-07-21 RX ORDER — RIVAROXABAN 20 MG/1
1 TABLET WITH FOOD TABLET, FILM COATED ORAL ONCE A DAY
Status: ACTIVE | COMMUNITY

## 2021-07-21 RX ORDER — LISINOPRIL 10 MG/1
1 TABLET TABLET ORAL ONCE A DAY
Status: ACTIVE | COMMUNITY

## 2021-07-21 RX ORDER — GLIPIZIDE 5 MG/1
1 TABLET 30 MINUTES BEFORE BREAKFAST TABLET ORAL ONCE A DAY
Status: ACTIVE | COMMUNITY

## 2021-07-21 RX ORDER — METOPROLOL TARTRATE 100 MG/1
1 TABLET WITH FOOD TABLET, FILM COATED ORAL TWICE A DAY
Status: ACTIVE | COMMUNITY

## 2021-07-21 NOTE — HPI-TODAY'S VISIT:
This is a 60-year-old male who presents for follow-up.  He was last seen in my practice by Dr. Merlos at the time of EGD and colonoscopy.  EGD did note a mild gastritis.  Colonoscopy showed internal hemorrhoids and a single small adenoma.  Patient notes that he continues to have severe abdominal pain and bloating.  This is requiring him to take narcotics.  His bowel movements are normal.  There is no nausea or vomiting.  He is quite frustrated over the lack of a diagnosis over the years.  After his last office visit he was asked to obtain a CT scan which I have no record of him completing.  He is unsure if he did.  He states that if he did it would have been through the [employer] system.  I have pulled up the [place] records and there is no imaging within the last year.

## 2021-08-26 ENCOUNTER — OFFICE VISIT (OUTPATIENT)
Dept: URBAN - METROPOLITAN AREA TELEHEALTH 2 | Facility: TELEHEALTH | Age: 61
End: 2021-08-26

## 2021-08-26 RX ORDER — GLIPIZIDE 5 MG/1
1 TABLET 30 MINUTES BEFORE BREAKFAST TABLET ORAL ONCE A DAY
Status: ACTIVE | COMMUNITY

## 2021-08-26 RX ORDER — RIVAROXABAN 20 MG/1
1 TABLET WITH FOOD TABLET, FILM COATED ORAL ONCE A DAY
Status: ACTIVE | COMMUNITY

## 2021-08-26 RX ORDER — LISINOPRIL 10 MG/1
1 TABLET TABLET ORAL ONCE A DAY
Status: ACTIVE | COMMUNITY

## 2021-08-26 RX ORDER — METOPROLOL TARTRATE 100 MG/1
1 TABLET WITH FOOD TABLET, FILM COATED ORAL TWICE A DAY
Status: ACTIVE | COMMUNITY

## 2021-08-26 RX ORDER — PANTOPRAZOLE 40 MG/1
TAKE 1 TABLET BY MOUTH ONCE DAILY FOR 90 DAYS TABLET, DELAYED RELEASE ORAL
Qty: 90 TABLET | Refills: 1 | Status: ACTIVE | COMMUNITY

## 2021-08-31 ENCOUNTER — OFFICE VISIT (OUTPATIENT)
Dept: URBAN - METROPOLITAN AREA CLINIC 92 | Facility: CLINIC | Age: 61
End: 2021-08-31

## 2022-04-30 ENCOUNTER — TELEPHONE ENCOUNTER (OUTPATIENT)
Dept: URBAN - METROPOLITAN AREA CLINIC 121 | Facility: CLINIC | Age: 62
End: 2022-04-30

## 2022-05-01 ENCOUNTER — TELEPHONE ENCOUNTER (OUTPATIENT)
Dept: URBAN - METROPOLITAN AREA CLINIC 121 | Facility: CLINIC | Age: 62
End: 2022-05-01

## 2022-05-23 ENCOUNTER — ERX REFILL RESPONSE (OUTPATIENT)
Dept: URBAN - METROPOLITAN AREA CLINIC 105 | Facility: CLINIC | Age: 62
End: 2022-05-23

## 2022-05-23 RX ORDER — PANTOPRAZOLE 40 MG/1
TAKE 1 TABLET BY MOUTH ONCE DAILY FOR 90 DAYS TABLET, DELAYED RELEASE ORAL
Qty: 90 TABLET | Refills: 1 | OUTPATIENT

## 2022-05-23 RX ORDER — PANTOPRAZOLE 40 MG/1
TAKE 1 TABLET BY MOUTH ONCE DAILY TABLET, DELAYED RELEASE ORAL
Qty: 90 TABLET | Refills: 1 | OUTPATIENT

## 2022-08-02 ENCOUNTER — OFFICE VISIT (OUTPATIENT)
Dept: URBAN - METROPOLITAN AREA CLINIC 92 | Facility: CLINIC | Age: 62
End: 2022-08-02
Payer: COMMERCIAL

## 2022-08-02 ENCOUNTER — WEB ENCOUNTER (OUTPATIENT)
Dept: URBAN - METROPOLITAN AREA CLINIC 92 | Facility: CLINIC | Age: 62
End: 2022-08-02

## 2022-08-02 VITALS
HEIGHT: 68 IN | DIASTOLIC BLOOD PRESSURE: 78 MMHG | SYSTOLIC BLOOD PRESSURE: 122 MMHG | WEIGHT: 172 LBS | HEART RATE: 64 BPM | BODY MASS INDEX: 26.07 KG/M2 | TEMPERATURE: 96.7 F

## 2022-08-02 DIAGNOSIS — R10.13 EPIGASTRIC PAIN: ICD-10-CM

## 2022-08-02 DIAGNOSIS — R63.4 WEIGHT LOSS: ICD-10-CM

## 2022-08-02 PROCEDURE — 99214 OFFICE O/P EST MOD 30 MIN: CPT | Performed by: INTERNAL MEDICINE

## 2022-08-02 RX ORDER — METOPROLOL TARTRATE 100 MG/1
1 TABLET WITH FOOD TABLET, FILM COATED ORAL TWICE A DAY
Status: ACTIVE | COMMUNITY

## 2022-08-02 RX ORDER — RIVAROXABAN 20 MG/1
1 TABLET WITH FOOD TABLET, FILM COATED ORAL ONCE A DAY
Status: ACTIVE | COMMUNITY

## 2022-08-02 RX ORDER — GLIPIZIDE 5 MG/1
1 TABLET 30 MINUTES BEFORE BREAKFAST TABLET ORAL ONCE A DAY
Status: ACTIVE | COMMUNITY

## 2022-08-02 RX ORDER — PANTOPRAZOLE 40 MG/1
TAKE 1 TABLET BY MOUTH ONCE DAILY TABLET, DELAYED RELEASE ORAL
Qty: 90 TABLET | Refills: 1 | Status: ACTIVE | COMMUNITY

## 2022-08-02 NOTE — HPI-TODAY'S VISIT:
This is a 60-year-old male who presents for follow-up.  He was last seen in my practice by Dr. Merlos at the time of EGD and colonoscopy.  EGD did note a mild gastritis.  Colonoscopy showed internal hemorrhoids and a single small adenoma.  Patient notes that he continues to have severe abdominal pain and bloating.  This is requiring him to take narcotics.  His bowel movements are normal.  There is no nausea or vomiting.  He is quite frustrated over the lack of a diagnosis over the years.  After his last office visit he was asked to obtain a CT scan which I have no record of him completing.  He is unsure if he did.  He states that if he did it would have been through the [employer] system.  I have pulled up the [place] records and there is no imaging within the last year.  ***8/2/22: continues to have diffuse abd pain, worse in epigastium. feels like he's a "skeleton," losing weight. thinks has recently lost 10# in past few mos. never got the CT scan done, forgot. stopped creon on his own. no etoh, +cigarettes. regular bms.

## 2022-08-02 NOTE — EXAM-PHYSICAL EXAM
CONSTITUTIONAL - well-developed, well-nourished, NAD HEENT - sclerae and conjuntivae appear normal, external nose normal appearance, no nasal discharge NECK - normal appearance, no deformities CHEST - no increased work of breathing, no accessory muscle use CV - no edema, regular rate GI - soft, diffuse abd pain, no guarding or rigidity, no palpable masses or HSM MSK - no deformities, normal gait SKIN - good turgor, no obvious rashes NEURO - AAOx3 PSYCH - normal mood and appropriate affect

## 2022-08-04 LAB
A/G RATIO: 1.9
ABSOLUTE BASOPHILS: 32
ABSOLUTE EOSINOPHILS: 68
ABSOLUTE LYMPHOCYTES: 1044
ABSOLUTE MONOCYTES: 378
ABSOLUTE NEUTROPHILS: 2979
ALBUMIN: 4.4
ALKALINE PHOSPHATASE: 68
ALT (SGPT): 21
AST (SGOT): 20
BASOPHILS: 0.7
BILIRUBIN, TOTAL: 0.7
BUN/CREATININE RATIO: (no result)
BUN: 18
CALCIUM: 9.6
CARBON DIOXIDE, TOTAL: 26
CHLORIDE: 106
CREATININE: 1.24
EGFR: 65
EOSINOPHILS: 1.5
GLOBULIN, TOTAL: 2.3
GLUCOSE: 130
HEMATOCRIT: 40.5
HEMOGLOBIN: 13.8
LIPASE: 8
LYMPHOCYTES: 23.2
MCH: 31.4
MCHC: 34.1
MCV: 92
MONOCYTES: 8.4
MPV: 11.6
NEUTROPHILS: 66.2
PLATELET COUNT: 177
POTASSIUM: 4.1
PROTEIN, TOTAL: 6.7
RDW: 13
RED BLOOD CELL COUNT: 4.4
SODIUM: 142
WHITE BLOOD CELL COUNT: 4.5

## 2022-08-11 ENCOUNTER — TELEPHONE ENCOUNTER (OUTPATIENT)
Dept: URBAN - METROPOLITAN AREA CLINIC 92 | Facility: CLINIC | Age: 62
End: 2022-08-11

## 2022-08-11 PROBLEM — 73211009 DIABETES MELLITUS: Status: ACTIVE | Noted: 2022-08-11

## 2022-08-11 PROBLEM — 5501000119106 POSTOPERATIVE HYPERTENSION: Status: ACTIVE | Noted: 2022-08-11

## 2022-08-11 PROBLEM — 75694006 PANCREATITIS: Status: ACTIVE | Noted: 2022-08-11

## 2022-08-11 NOTE — HPI-TODAY'S VISIT:
Patient is a 61-year-old male who we are being asked to see by Dr Lea and recently seen by Dr. Yesenia Holden for evaluation of reported hepatitis B core pos and prior HCV tx 4-5 yrs ago. A copy of the note will be sent to the referring provider. Pt with prior hcv from prior exposures earlier and tx hcv 4-5 yrs ago and he is not sure who he saw for that. Not seeing Dr Lea for scleroderma and RA and on plaquenil and being considered for Rituximab and sent to discuss preventative tx. Pt not understanding dx and today came in and asking several times re his pain and was told was to start pain med by Dr Holden today and though appt was with her. I called her to see if she could see him and he could not relate to the issues today as was not understanding why seeing us for as the other issue was his main issue.  Patient was just seen by her on August 2 of this year and noted to have a history of a pacemaker, pancreatitis, smoker, scleroderma, hep C treatment in the past, atrial fibrillation fortunately on Xarelto, hypertrophic cardiomyopathy, and came in to discuss abdominal pain issues with her.  She suspected possible role of pancreas versus narcotic bowel issues. Aug labs showed ast 20 and alt 21 and alk 68 and tv 0.7 and bun 18 and cr 1.24 and glu 130 elevated. Wbc 4.5 hg 13.8 plat 177. Prior egd and colon with Dr Merlos and had int hemorrhoids on colon and single small adenoma nd with severe abd pain.  BMI 26.15. April 2020 ast 16 and alt 15 and alk 91 and tb 1.2. hcv pcr neg. April 2019 hcv pcr neg. May 2019 a igm neg and hcv abd pos and b sag neg and b core igm neg, Referral noted for hep b. B core pos and they wanted pt to be seen for hep b prphylaxis on rituximab by Dr Anthony Lea. 1.	Hep b core pos and being considered for isp by Dr Lea and issues are to treat now to prevent reactivation risk that is higher with biologic or do reactive tx. I would like to check b immunity and discussed with pt the pros and cons of tx. If the preventative tx done can stop it 6-12m post the isp stopped and otherwise is reactive tx and tends to not reverse in many cases. Would do imaging also to assess liver status given prior hcv tx. 2.	HCV prior tx and report cirrhosis and neg last check but do now and do imaging to assess fibrosis even when neg need imaging every 6m for this. Ct pending 3.	Vaccine counseling check for hep a immunity status and b as well as stronger b ab helps lower risk of this also. 4.	ISp planned for by rheum and need to assess for tx for pt. 5.	Copd hx and check alpha one hx.  6.	A fib hx noted and as per team. On anticoagulation. 7.	Abd pain and being assess by Dr Holden. 8.	Hx egd and colon done prior Dr Merlos and pending ct to be done may need to so sooner. 9.	Anticoagulation as per primary. 10.	Hx colon polyps as per team. Plan: 1.	Check hep a immunity. 2.	Check hep b sab quant and b core total. 3.	Check hcv pcr. 4.	Check u.s of liver to check fibrosis state of pt as not clear. 5.	If pos and to be on this may benefit from hep b preventive med for him. Stressed to pt the need for social distancing and strict handwashing and wearing a mask and to follow any other new or added CDC recommendations as this is an evolving target.  Duration of the visit was 0 min with 20 min of chart prep reviewing data and information that was available for the visit and setting up in ecw and then an additional 0 min for the face to face/telemed visit today with time spent reviewing said material and their clinical correlates and then with this information being used to formulate a treatment plan.

## 2022-08-12 ENCOUNTER — OFFICE VISIT (OUTPATIENT)
Dept: URBAN - METROPOLITAN AREA CLINIC 92 | Facility: CLINIC | Age: 62
End: 2022-08-12
Payer: COMMERCIAL

## 2022-08-12 ENCOUNTER — TELEPHONE ENCOUNTER (OUTPATIENT)
Dept: URBAN - METROPOLITAN AREA CLINIC 92 | Facility: CLINIC | Age: 62
End: 2022-08-12

## 2022-08-12 ENCOUNTER — OFFICE VISIT (OUTPATIENT)
Dept: URBAN - METROPOLITAN AREA CLINIC 92 | Facility: CLINIC | Age: 62
End: 2022-08-12

## 2022-08-12 ENCOUNTER — OFFICE VISIT (OUTPATIENT)
Dept: URBAN - METROPOLITAN AREA CLINIC 86 | Facility: CLINIC | Age: 62
End: 2022-08-12

## 2022-08-12 VITALS
WEIGHT: 172 LBS | BODY MASS INDEX: 26.07 KG/M2 | SYSTOLIC BLOOD PRESSURE: 123 MMHG | TEMPERATURE: 96.9 F | HEIGHT: 68 IN | HEART RATE: 69 BPM | DIASTOLIC BLOOD PRESSURE: 85 MMHG

## 2022-08-12 VITALS
WEIGHT: 172.4 LBS | HEIGHT: 68 IN | SYSTOLIC BLOOD PRESSURE: 145 MMHG | HEART RATE: 79 BPM | TEMPERATURE: 97.2 F | DIASTOLIC BLOOD PRESSURE: 96 MMHG | BODY MASS INDEX: 26.13 KG/M2

## 2022-08-12 DIAGNOSIS — R63.4 WEIGHT LOSS: ICD-10-CM

## 2022-08-12 DIAGNOSIS — R10.13 EPIGASTRIC PAIN: ICD-10-CM

## 2022-08-12 PROCEDURE — 99213 OFFICE O/P EST LOW 20 MIN: CPT | Performed by: INTERNAL MEDICINE

## 2022-08-12 RX ORDER — GLIPIZIDE 5 MG/1
1 TABLET 30 MINUTES BEFORE BREAKFAST TABLET ORAL ONCE A DAY
Status: ACTIVE | COMMUNITY

## 2022-08-12 RX ORDER — METOPROLOL TARTRATE 100 MG/1
1 TABLET WITH FOOD TABLET, FILM COATED ORAL TWICE A DAY
Status: ACTIVE | COMMUNITY

## 2022-08-12 RX ORDER — PANTOPRAZOLE 40 MG/1
TAKE 1 TABLET BY MOUTH ONCE DAILY TABLET, DELAYED RELEASE ORAL
Qty: 90 TABLET | Refills: 1 | Status: ACTIVE | COMMUNITY

## 2022-08-12 RX ORDER — RIVAROXABAN 20 MG/1
1 TABLET WITH FOOD TABLET, FILM COATED ORAL ONCE A DAY
Status: ACTIVE | COMMUNITY

## 2022-08-12 RX ORDER — DICYCLOMINE HYDROCHLORIDE 10 MG/1
1-2 TABLETS CAPSULE ORAL
Qty: 60 | Refills: 3 | OUTPATIENT
Start: 2022-08-12 | End: 2022-12-09

## 2022-08-12 NOTE — HPI-TODAY'S VISIT:
This is a 60-year-old male who presents for follow-up.  He was last seen in my practice by Dr. Merlos at the time of EGD and colonoscopy.  EGD did note a mild gastritis.  Colonoscopy showed internal hemorrhoids and a single small adenoma.  Patient notes that he continues to have severe abdominal pain and bloating.  This is requiring him to take narcotics.  His bowel movements are normal.  There is no nausea or vomiting.  He is quite frustrated over the lack of a diagnosis over the years.  After his last office visit he was asked to obtain a CT scan which I have no record of him completing.  He is unsure if he did.  He states that if he did it would have been through the [employer] system.  I have pulled up the [place] records and there is no imaging within the last year.  ***8/2/22: continues to have diffuse abd pain, worse in epigastium. feels like he's a "skeleton," losing weight. thinks has recently lost 10# in past few mos. never got the CT scan done, forgot. stopped creon on his own. no etoh, +cigarettes. regular bms.  ***8/12/22: thinks he is here to discuss getting some medication for his stomach. continues to have abd pain, doesn't know if he had the CT done this week?

## 2022-08-22 ENCOUNTER — LAB OUTSIDE AN ENCOUNTER (OUTPATIENT)
Dept: URBAN - METROPOLITAN AREA CLINIC 92 | Facility: CLINIC | Age: 62
End: 2022-08-22

## 2022-08-22 LAB
CREATININE POC: 1.1
PERFORMING LAB: (no result)

## 2022-09-01 ENCOUNTER — OFFICE VISIT (OUTPATIENT)
Dept: URBAN - METROPOLITAN AREA TELEHEALTH 2 | Facility: TELEHEALTH | Age: 62
End: 2022-09-01
Payer: COMMERCIAL

## 2022-09-01 VITALS — BODY MASS INDEX: 25.46 KG/M2 | HEIGHT: 68 IN | WEIGHT: 168 LBS

## 2022-09-01 DIAGNOSIS — K86.1 CHRONIC PANCREATITIS: ICD-10-CM

## 2022-09-01 DIAGNOSIS — R10.13 EPIGASTRIC PAIN: ICD-10-CM

## 2022-09-01 DIAGNOSIS — R63.4 WEIGHT LOSS: ICD-10-CM

## 2022-09-01 DIAGNOSIS — J44.9 COPD (CHRONIC OBSTRUCTIVE PULMONARY DISEASE): ICD-10-CM

## 2022-09-01 DIAGNOSIS — R89.4 HEPATITIS B CORE ANTIBODY POSITIVE: ICD-10-CM

## 2022-09-01 PROBLEM — 235494005 CHRONIC PANCREATITIS: Status: ACTIVE | Noted: 2021-01-22

## 2022-09-01 PROCEDURE — 99441 PHONE E/M BY PHYS 5-10 MIN: CPT | Performed by: INTERNAL MEDICINE

## 2022-09-01 PROCEDURE — 99442 PHONE E/M BY PHYS 11-20 MIN: CPT | Performed by: INTERNAL MEDICINE

## 2022-09-01 RX ORDER — GLIPIZIDE 5 MG/1
1 TABLET 30 MINUTES BEFORE BREAKFAST TABLET ORAL ONCE A DAY
Status: ACTIVE | COMMUNITY

## 2022-09-01 RX ORDER — RIVAROXABAN 20 MG/1
1 TABLET WITH FOOD TABLET, FILM COATED ORAL ONCE A DAY
Status: ACTIVE | COMMUNITY

## 2022-09-01 RX ORDER — DICYCLOMINE HYDROCHLORIDE 10 MG/1
1-2 TABLETS CAPSULE ORAL
Qty: 60 | Refills: 3 | Status: ACTIVE | COMMUNITY
Start: 2022-08-12 | End: 2022-12-09

## 2022-09-01 RX ORDER — PANTOPRAZOLE 40 MG/1
TAKE 1 TABLET BY MOUTH ONCE DAILY TABLET, DELAYED RELEASE ORAL
Qty: 90 TABLET | Refills: 1 | Status: ACTIVE | COMMUNITY

## 2022-09-01 RX ORDER — METOPROLOL TARTRATE 100 MG/1
1 TABLET WITH FOOD TABLET, FILM COATED ORAL TWICE A DAY
Status: ACTIVE | COMMUNITY

## 2022-09-01 NOTE — HPI-TODAY'S VISIT:
63 yo male for follow up. stable compalints for years. prior work up negative. taking creon. needs liver clinic follow up. no acute findings on CT

## 2022-09-05 ENCOUNTER — TELEPHONE ENCOUNTER (OUTPATIENT)
Dept: URBAN - METROPOLITAN AREA CLINIC 92 | Facility: CLINIC | Age: 62
End: 2022-09-05

## 2022-09-05 PROBLEM — 365634009: Status: ACTIVE | Noted: 2020-07-14

## 2022-09-05 PROBLEM — 255417007 CIRRHOTIC: Status: ACTIVE | Noted: 2022-08-11

## 2022-09-05 PROBLEM — 314706002 HEPATITIS C ANTIBODY TEST POSITIVE: Status: ACTIVE | Noted: 2022-09-05

## 2022-09-05 NOTE — HPI-TODAY'S VISIT:
Patient is a 62-year-old male being referred in by Dr. Anthony Lea for hepatitis B prophylaxis need while getting rituximab for rheumatoid arthritis.  Also noted at hep C treated in 2019 and confirmed B core positive. We reviewed a variety of records including some [place] records as well as local records regarding this patient. Patient listed as having an allergy to contrast based media specifically iodine base that causes hives. Medicines listed include metoprolol 100 mg once a day, olmesartan 40 mg a day, Xarelto 20 mg a day, Novolin 70/30 30 units twice a day as needed, lisinopril 40mg po qd. acetaminophen with hydrocodone 325/5 with every 4 hours as needed, finasteride 5 mg a day, Trelegy Ellipta 100/62.5/25 1 puff daily.  Glimepiride 4 mg a day.  Pantoprazole 40 mg a day.  Pregabalin 75 mg a day.  Savella 400 mg a day as needed.  Tamsulosin 0.4 mg a day. Dr kohli note sept 2021: Patient has persistent atrial fibrillation and had early recurrence despite cardioversion while on amiodarone.  Ablation was being discussed.  Intrafibrillation ablation in the setting of hypertrophic cardiomyopathy is more difficult and not as optimal per his note. Mentions EGD and colonoscopy in June 2021 were unremarkable. June 30, 2021 EGD with Dr. Facundo Merlos showed esophagus normal and some patchy mild erythematous mucosa in the gastric body and gastric antrum. June 30, 2021 colonoscopy revealed internal hemorrhoids that were grade 1 2 mm polyp in ascending colon.  Path showed fragments of tubular adenoma. Patient had November 18,019 ultrasound of the liver showed normal echogenicity with a 9 x 7 x 7 mm ovoid echogenic lesions in the posterior right lobe previously 8 x 8 x 10 mm likely meningioma.  Liver vessels were patent.  Gallbladder was normal.  Pancreas normal.  Right kidney 9.9 cm.  Elastography was 8.76 Kpa f1-2 range June 24 patient last seen by cardiology with an episode of near syncope.  Did not go to the emergency room.  They interrogated his defibrillator and revealed a V. tach episode treated with therapy. Social history no alcohol does smoke 7 to 8 cigarettes a day. Family history Father with diabetes and hypertension. Past medical history apical variant hypertrophic cardiomyopathy, cardiac defibrillator in place, chronic hep C, history of cirrhosis, diabetes, hypertension, mitral valve prolapse, post ablation atrial fibrillation.  Tobacco use.  V. tach. Past surgeries include cholecystectomy 2012, ICD in 2004, colonoscopy 2014 and 2019.  EGD: 2020.  Cardioversion March 2021. September 2021 labs show sodium 140 potassium 4.2 glucose 134 BUN of 16 creatinine 1.19 White blood cell count 5.5 hemoglobin 14.9 plate count 172. August 31, 2021 INR 2.59. 1.  Hep B core positive state noted.  2 options here to either treat prophylactically to prevent the patient running the risk of reactivation which may be desired given their other concomitant medical problems with treatment with her heart and other medications to avoid that added stress or to treat with the rituximab and then add treatment if recurrent seen.  Need to check the hep B surface antibody and other labs now.  Reassess options from there.  Need to confirm patient not active at this time as do not have those records. 2.  Chronic hepatitis C previously treated and need to confirm the patient cleared. 3.  Liver imaging last done a while ago did not show any lesions of concern but did have possible hemangioma.  We will start by getting repeat ultrasound with Doppler here now to confirm same and looking for fibrosis state.  Prior fibrosis state estimated to be F1 to F2 so some hepatic fibrosis was clearly still present then.  Could potentially follow-up on same. 4.  Rheumatoid arthritis and sees Dr. Anthony eLa for this and consider treatment options. 5.  High risk medicine usage being contemplated for rituximab which can potentially reactivate hep B and is one of the high risk medicines.  Also on other medications for heart status including anticoagulation as well. 6.  Vaccine counseling needs recheck for hepatitis A as well as check B surface antibody quantitative titer.  We know that he is B core positive for report. 7.  COPD history noted and on medications for same. 8.  Chronic atrial fibrillation and on anticoagulation for same. 9.  History of abdominal pain and seen by Jeanine allison in the past for same.  Has had EGD and colonoscopy done last year. 10.  History of colon polyps noted. 11.  History of hypertrophic cardiomyopathy and AICD placement in past.  Sees Dr. Kohli for this.  Had previous A. fib ablation noted.  Plan: 1.	_update imaging. 2.	_update labs including B core status B surface antibody B surface antigen. 3.	Discussed pros and cons of the biologic with medication or without.  If taken with medication tend to have a lower chance of reactivation and more easily able to come off at the end of treatment.  If done more proactively that way.  If done more reactively then unfortunately many times can be controlled but many times also the hep B does not clear post removal of biologic. Stressed to pt the need for social distancing and strict handwashing and wearing a mask and to follow any other new or added CDC recommendations as this is an evolving target.   Duration of the visit was 0 min with 20 min of chart prep reviewing data and infDuration of the visit was 0 minutes with 20 minutes of chart prep and 20 minutes by dox video and then by phone due to internet issues by phone for this TeleMed visit with time reviewing their prior and recent records and labs and discussing their current status and future plans for care for the patient.

## 2022-09-08 ENCOUNTER — WEB ENCOUNTER (OUTPATIENT)
Dept: URBAN - METROPOLITAN AREA CLINIC 86 | Facility: CLINIC | Age: 62
End: 2022-09-08

## 2022-09-08 ENCOUNTER — LAB OUTSIDE AN ENCOUNTER (OUTPATIENT)
Dept: URBAN - METROPOLITAN AREA CLINIC 86 | Facility: CLINIC | Age: 62
End: 2022-09-08

## 2022-09-08 ENCOUNTER — OFFICE VISIT (OUTPATIENT)
Dept: URBAN - METROPOLITAN AREA CLINIC 86 | Facility: CLINIC | Age: 62
End: 2022-09-08
Payer: COMMERCIAL

## 2022-09-08 VITALS
WEIGHT: 175 LBS | BODY MASS INDEX: 26.52 KG/M2 | HEIGHT: 68 IN | DIASTOLIC BLOOD PRESSURE: 89 MMHG | TEMPERATURE: 97.2 F | HEART RATE: 69 BPM | SYSTOLIC BLOOD PRESSURE: 137 MMHG

## 2022-09-08 DIAGNOSIS — B18.2 CHRONIC HEPATITIS C: ICD-10-CM

## 2022-09-08 DIAGNOSIS — Z71.89 VACCINE COUNSELING: ICD-10-CM

## 2022-09-08 DIAGNOSIS — I48.91 ATRIAL FIBRILLATION: ICD-10-CM

## 2022-09-08 DIAGNOSIS — Z86.010 HISTORY OF COLON POLYPS: ICD-10-CM

## 2022-09-08 DIAGNOSIS — Z79.01 ANTICOAGULANT LONG-TERM USE: ICD-10-CM

## 2022-09-08 DIAGNOSIS — I42.2 HYPERTROPHIC CARDIOMYOPATHY: ICD-10-CM

## 2022-09-08 DIAGNOSIS — Z79.899 HIGH RISK MEDICATION USE: ICD-10-CM

## 2022-09-08 DIAGNOSIS — J44.9 COPD (CHRONIC OBSTRUCTIVE PULMONARY DISEASE): ICD-10-CM

## 2022-09-08 DIAGNOSIS — R89.4 HEPATITIS B CORE ANTIBODY POSITIVE: ICD-10-CM

## 2022-09-08 DIAGNOSIS — R10.9 ABDOMINAL PAIN, UNSPECIFIED ABDOMINAL LOCATION: ICD-10-CM

## 2022-09-08 DIAGNOSIS — M06.9 RHEUMATOID ARTHRITIS: ICD-10-CM

## 2022-09-08 PROCEDURE — 99215 OFFICE O/P EST HI 40 MIN: CPT

## 2022-09-08 RX ORDER — PANTOPRAZOLE 40 MG/1
TAKE 1 TABLET BY MOUTH ONCE DAILY TABLET, DELAYED RELEASE ORAL
Qty: 90 TABLET | Refills: 1 | Status: ACTIVE | COMMUNITY

## 2022-09-08 RX ORDER — OLMESARTAN MEDOXOMIL 40 MG/1
1 TABLET TABLET, FILM COATED ORAL ONCE A DAY
Status: ACTIVE | COMMUNITY

## 2022-09-08 RX ORDER — RIVAROXABAN 20 MG/1
1 TABLET WITH FOOD TABLET, FILM COATED ORAL ONCE A DAY
Status: ACTIVE | COMMUNITY

## 2022-09-08 RX ORDER — PREGABALIN 75 MG/1
1 CAPSULE CAPSULE ORAL TWICE A DAY
Status: ACTIVE | COMMUNITY

## 2022-09-08 RX ORDER — TAMSULOSIN HYDROCHLORIDE 0.4 MG/1
1 CAPSULE CAPSULE ORAL ONCE A DAY
Status: ACTIVE | COMMUNITY

## 2022-09-08 RX ORDER — GLIMEPIRIDE 4 MG/1
1 TABLET WITH BREAKFAST OR THE FIRST MAIN MEAL OF THE DAY TABLET ORAL ONCE A DAY
Status: ACTIVE | COMMUNITY

## 2022-09-08 RX ORDER — HUMAN INSULIN 100 [IU]/ML
30 UNITS INJECTION, SUSPENSION SUBCUTANEOUS BID
Status: ACTIVE | COMMUNITY

## 2022-09-08 RX ORDER — SILDENAFIL 100 MG/1
1 TABLET AS NEEDED TABLET, FILM COATED ORAL ONCE A DAY
Status: ACTIVE | COMMUNITY

## 2022-09-08 RX ORDER — FINASTERIDE 5 MG/1
1 TABLET TABLET, FILM COATED ORAL ONCE A DAY
Status: ACTIVE | COMMUNITY

## 2022-09-08 RX ORDER — LISINOPRIL 40 MG/1
1 TABLET TABLET ORAL ONCE A DAY
Status: ACTIVE | COMMUNITY

## 2022-09-08 RX ORDER — GLIPIZIDE 5 MG/1
1 TABLET 30 MINUTES BEFORE BREAKFAST TABLET ORAL ONCE A DAY
Status: ACTIVE | COMMUNITY

## 2022-09-08 RX ORDER — HYDROCODONE BITARTRATE AND ACETAMINOPHEN 5; 325 MG/1; MG/1
1 TABLET AS NEEDED TABLET ORAL
Status: ACTIVE | COMMUNITY

## 2022-09-08 RX ORDER — METOPROLOL TARTRATE 100 MG/1
1 TABLET WITH FOOD TABLET, FILM COATED ORAL TWICE A DAY
Status: ACTIVE | COMMUNITY

## 2022-09-08 RX ORDER — DICYCLOMINE HYDROCHLORIDE 10 MG/1
1-2 TABLETS CAPSULE ORAL
Qty: 60 | Refills: 3 | Status: ACTIVE | COMMUNITY
Start: 2022-08-12 | End: 2022-12-09

## 2022-09-08 RX ORDER — FLUTICASONE FUROATE, UMECLIDINIUM BROMIDE AND VILANTEROL TRIFENATATE 100; 62.5; 25 UG/1; UG/1; UG/1
1 PUFF POWDER RESPIRATORY (INHALATION) ONCE A DAY
Status: ACTIVE | COMMUNITY

## 2022-09-08 NOTE — EXAM-PHYSICAL EXAM
Gen: no distress, wearing a mask Eyes: anicteric and normal lids Mouth: wearing a mask Neck trachea midline and no jvd CV: RRR no s3 Lungs: Clear ant and no wheezes Abd: No tenderness or pain reported to exam and liver not enlarged and nabs and spleen normal Ext: no edema some palm erythema Neuro: Responsive and no distress and alert and oriented, no asterixis observed,

## 2022-09-08 NOTE — HPI-TODAY'S VISIT:
Patient is a 62-year-old male being referred in by Dr. Anthony Lea for hepatitis B prophylaxis for b core status and impending rituximab for rheumatoid arthritis.  A copy ofm the note will be sent to the referring provider.  The notes that sent showed that he was hep C treated in 2019 and around then also confirmed B core positive.  The hep b core pos in spme studies with rituximab 3-25% risk to reactivate if not treated to prevent this and the reactivation carries a risk of mortality.  The option is to treat the pt with this high risk med is to tx.  We reviewed a variety of records including some [place] records as well as local records regarding this patient.  Patient listed as having an allergy to contrast based media specifically iodine base that causes hives.  Medicines listed include metoprolol 100 mg once a day, olmesartan 40 mg a day, Xarelto 20 mg a day, Novolin 70/30 30 units twice a day as needed, lisinopril 40mg po qd. acetaminophen with hydrocodone 325/5 with every 4 hours as needed, finasteride 5 mg a day, Trelegy Ellipta 100/62.5/25 1 puff daily.  Glimepiride 4 mg a day.  Pantoprazole 40 mg a day.  Pregabalin 75 mg a day.  Plaquenil. Tamsulosin 0.4 mg a day.  Dr Weeks note sept 2021: Patient has persistent atrial fibrillation and had early recurrence despite cardioversion while on amiodarone.  Ablation was being discussed. Ablation in the setting of hypertrophic cardiomyopathy is more difficult and not as optimal per his note.  Mentions EGD and colonoscopy in June 2021 were unremarkable.  June 30, 2021 EGD with Dr. Facundo Merlos showed esophagus normal and some patchy mild erythematous mucosa in the gastric body and gastric antrum.  June 30, 2021 colonoscopy revealed internal hemorrhoids that were grade 1 and  2 mm polyp in ascending colon.  Path showed fragments of tubular adenoma.  Patient had November 18,2019 ultrasound of the liver showed normal echogenicity with a 9 x 7 x 7 mm ovoid echogenic lesions in the posterior right lobe previously 8 x 8 x 10 mm likely hemangioma.  Liver vessels were patent.  Gallbladder was normal.  Pancreas normal.  Right kidney 9.9 cm.  Elastography was 8.76 Kpa f1-2 range  June 24 2022 patient last seen by cardiology with an episode of near syncope.  Did not go to the emergency room.  They interrogated his defibrillator and revealed a V. tach episode treated with therapy.  Social history no alcohol does smoke 7 to 8 cigarettes a day and trying to stop but not able to do so.  Family history Father with diabetes and hypertension.  Past medical history apical variant hypertrophic cardiomyopathy, cardiac defibrillator in place, chronic hep C, history of cirrhosis, diabetes, hypertension, mitral valve prolapse, post ablation atrial fibrillation.  Tobacco use.  V. tach.  Past surgeries include cholecystectomy 2012, ICD in 2004, colonoscopy 2014 and 2019.  EGD: 2020.   Egd and colon 2021. Cardioversion March 2021.  September 2021 labs show sodium 140 potassium 4.2 glucose 134 BUN of 16 creatinine 1.19 White blood cell count 5.5 hemoglobin 14.9 plate count 172.  August 31, 2021 INR 2.59.  Need to get labs now and check b core and bs ab and hcv pcr and we need to see how renal function.  Dr Lea is waiting for eval to start the rituximab to start.  Discussed the risk for this.  Plan: 1.	We will update imaging. 2.	We will update labs including B core status B surface antibody B surface antigen. 3.	Rituximab is considerd a higher risk med and ashould be covered and so cannot do a reactive tx due to risk for the pt with his heart issues. If has flare that could be higher riks for the pt.  Stressed to pt the need for social distancing and strict handwashing and wearing a mask and to follow any other new or added CDC recommendations as this is an evolving target.   Duration of the visit was 50 min with 25 min of chart prep reviewing data and infDuration of the visit was 0 minutes with 20 minutes of chart prep and 25 minutes by face to face visit  with time reviewing their prior and recent records and labs and discussing their current status and future plans for care for the patient.

## 2022-09-18 ENCOUNTER — TELEPHONE ENCOUNTER (OUTPATIENT)
Dept: URBAN - METROPOLITAN AREA CLINIC 92 | Facility: CLINIC | Age: 62
End: 2022-09-18

## 2022-09-18 LAB
A/G RATIO: 1.8
ABSOLUTE BASOPHILS: 31
ABSOLUTE EOSINOPHILS: 68
ABSOLUTE LYMPHOCYTES: 905
ABSOLUTE MONOCYTES: 530
ABSOLUTE NEUTROPHILS: 3666
ALBUMIN: 4.4
ALKALINE PHOSPHATASE: 79
ALT (SGPT): 22
AST (SGOT): 18
BASOPHILS: 0.6
BILIRUBIN, TOTAL: 0.4
BUN/CREATININE RATIO: (no result)
BUN: 18
CALCIUM: 9.2
CARBON DIOXIDE, TOTAL: 25
CHLORIDE: 105
CREATININE: 1.15
EGFR: 71
EOSINOPHILS: 1.3
GLOBULIN, TOTAL: 2.4
GLUCOSE: 110
HCV RNA, QUANTITATIVE: <1.18
HCV RNA, QUANTITATIVE: <15
HEMATOCRIT: 38.8
HEMOGLOBIN: 14
HEPATITIS A AB, TOTAL: (no result)
HEPATITIS B CORE AB TOTAL: REACTIVE
HEPATITIS B SURFACE AB IMMUNITY, QN: <5
HEPATITIS B SURFACE ANTIGEN: (no result)
INR: 1.4
LYMPHOCYTES: 17.4
MCH: 32.6
MCHC: 36.1
MCV: 90.2
MONOCYTES: 10.2
MPV: 12.2
NEUTROPHILS: 70.5
PLATELET COUNT: 164
POTASSIUM: 4
PROTEIN, TOTAL: 6.8
PT: 14
RDW: 13
RED BLOOD CELL COUNT: 4.3
SODIUM: 142
WHITE BLOOD CELL COUNT: 5.2

## 2022-09-18 NOTE — HPI-TODAY'S VISIT:
Dear Selvin Devries, September 15 labs show no immunity to hepatitis A and that is something long-term to look at getting specifically the hepatitis A vaccine series (2 doses 6m apart)  to protect you against this. Hepatitis C PCR less than 15 and not mentioned as being detected.   Hep B surface antigen negative.  No immunity seen to hepatitis B with regards to surface antibody but the hep B core total was positive and that would indicate a previous exposure to hepatitis B and if you were to receive a single dose of the hep B vaccine, in theory you should form a memory cell induced antibody response to that. Will not prevent need to hep b prevention meds with more potent immunosuppressants. INR elevated at 1.4.  Glucose slightly up at 110 previously 130.  BUN of 18 creatinine 1.15 down from 1.24 previously.  Sodium 142 potassium 4.0 albumin 4.4 bilirubin 0.4 alkaline phosphatase 79 AST of 18 and ALT of 22.  Previously AST 20 and ALT 21. White blood cell count 5.2 hemoglobin 14 plate count 164 and MCV 90.2.  The mean corpuscular hemoglobin concentration was slightly up at 36.1 with normal being from 32 up to 36.  Previously last month it had been normal which would suggest possibly a hydrational state affect. Neutrophils normal at 3666 and lymphocytes normal at 905 In summary, we reconfirmed that you need to consider getting the hep B single dose booster preferably before you start on the immunologic therapy.  This will help raise your HBV surface antibody titer.  It will not unfortunately prevent you from needing to be on preventative medicine for the possible upcoming rituximab treatment option is being considered for your rheumatoid issues. You should also consider getting the hep A vaccine series as well. If you would like to get the hepatitis B single dose booster here, let us know and we are happy to order it for you.  We can also order the hep A series as well for you.  Conversely, you could try to get this locally closer to you. Dr. Boggs

## 2022-09-23 ENCOUNTER — LAB OUTSIDE AN ENCOUNTER (OUTPATIENT)
Dept: URBAN - METROPOLITAN AREA CLINIC 92 | Facility: CLINIC | Age: 62
End: 2022-09-23

## 2022-09-23 ENCOUNTER — TELEPHONE ENCOUNTER (OUTPATIENT)
Dept: URBAN - METROPOLITAN AREA CLINIC 92 | Facility: CLINIC | Age: 62
End: 2022-09-23

## 2022-09-23 RX ORDER — HUMAN INSULIN 100 [IU]/ML
30 UNITS INJECTION, SUSPENSION SUBCUTANEOUS BID
Status: ACTIVE | COMMUNITY

## 2022-09-23 RX ORDER — HYDROCODONE BITARTRATE AND ACETAMINOPHEN 5; 325 MG/1; MG/1
1 TABLET AS NEEDED TABLET ORAL
Status: ACTIVE | COMMUNITY

## 2022-09-23 RX ORDER — TAMSULOSIN HYDROCHLORIDE 0.4 MG/1
1 CAPSULE CAPSULE ORAL ONCE A DAY
Status: ACTIVE | COMMUNITY

## 2022-09-23 RX ORDER — PREGABALIN 75 MG/1
1 CAPSULE CAPSULE ORAL TWICE A DAY
Status: ACTIVE | COMMUNITY

## 2022-09-23 RX ORDER — GLIMEPIRIDE 4 MG/1
1 TABLET WITH BREAKFAST OR THE FIRST MAIN MEAL OF THE DAY TABLET ORAL ONCE A DAY
Status: ACTIVE | COMMUNITY

## 2022-09-23 RX ORDER — PANTOPRAZOLE 40 MG/1
TAKE 1 TABLET BY MOUTH ONCE DAILY TABLET, DELAYED RELEASE ORAL
Qty: 90 TABLET | Refills: 1 | Status: ACTIVE | COMMUNITY

## 2022-09-23 RX ORDER — DICYCLOMINE HYDROCHLORIDE 10 MG/1
1-2 TABLETS CAPSULE ORAL
Qty: 60 | Refills: 3 | Status: ACTIVE | COMMUNITY
Start: 2022-08-12 | End: 2022-12-09

## 2022-09-23 RX ORDER — FINASTERIDE 5 MG/1
1 TABLET TABLET, FILM COATED ORAL ONCE A DAY
Status: ACTIVE | COMMUNITY

## 2022-09-23 RX ORDER — RIVAROXABAN 20 MG/1
1 TABLET WITH FOOD TABLET, FILM COATED ORAL ONCE A DAY
Status: ACTIVE | COMMUNITY

## 2022-09-23 RX ORDER — METOPROLOL TARTRATE 100 MG/1
1 TABLET WITH FOOD TABLET, FILM COATED ORAL TWICE A DAY
Status: ACTIVE | COMMUNITY

## 2022-09-23 RX ORDER — TENOFOVIR ALAFENAMIDE 25 MG/1
TAKE 1 TABLET BY MOUTH DAILY TABLET ORAL ONCE A DAY
Qty: 30 | Refills: 1 | OUTPATIENT

## 2022-09-23 RX ORDER — OLMESARTAN MEDOXOMIL 40 MG/1
1 TABLET TABLET, FILM COATED ORAL ONCE A DAY
Status: ACTIVE | COMMUNITY

## 2022-09-23 RX ORDER — SILDENAFIL 100 MG/1
1 TABLET AS NEEDED TABLET, FILM COATED ORAL ONCE A DAY
Status: ACTIVE | COMMUNITY

## 2022-09-23 RX ORDER — GLIPIZIDE 5 MG/1
1 TABLET 30 MINUTES BEFORE BREAKFAST TABLET ORAL ONCE A DAY
Status: ACTIVE | COMMUNITY

## 2022-09-23 RX ORDER — LISINOPRIL 40 MG/1
1 TABLET TABLET ORAL ONCE A DAY
Status: ACTIVE | COMMUNITY

## 2022-09-23 RX ORDER — FLUTICASONE FUROATE, UMECLIDINIUM BROMIDE AND VILANTEROL TRIFENATATE 100; 62.5; 25 UG/1; UG/1; UG/1
1 PUFF POWDER RESPIRATORY (INHALATION) ONCE A DAY
Status: ACTIVE | COMMUNITY

## 2022-09-23 NOTE — HPI-TODAY'S VISIT:
He has scleroderma and RA and she needs himn treated asap and so we will get rthe hep b vaccine x1 next week and put in for vemlidy. his cr has been up prior so favor that over viread due to renal risk and will be on it for the durationof rituxan and for 1-2 yrs post.  Called pt and left vmail re this.  Dr Hairston [phone]

## 2022-09-29 ENCOUNTER — CLAIMS CREATED FROM THE CLAIM WINDOW (OUTPATIENT)
Dept: URBAN - METROPOLITAN AREA CLINIC 85 | Facility: CLINIC | Age: 62
End: 2022-09-29
Payer: COMMERCIAL

## 2022-09-29 DIAGNOSIS — Z23 COVID-19 VACCINE ADMINISTERED: ICD-10-CM

## 2022-09-29 PROCEDURE — 90471 IMMUNIZATION ADMIN: CPT

## 2022-09-29 PROCEDURE — 90746 HEPB VACCINE 3 DOSE ADULT IM: CPT

## 2022-09-29 PROCEDURE — G0010 ADMIN HEPATITIS B VACCINE: HCPCS

## 2022-09-29 RX ORDER — HYDROCODONE BITARTRATE AND ACETAMINOPHEN 5; 325 MG/1; MG/1
1 TABLET AS NEEDED TABLET ORAL
Status: ACTIVE | COMMUNITY

## 2022-09-29 RX ORDER — PANTOPRAZOLE 40 MG/1
TAKE 1 TABLET BY MOUTH ONCE DAILY TABLET, DELAYED RELEASE ORAL
Qty: 90 TABLET | Refills: 1 | Status: ACTIVE | COMMUNITY

## 2022-09-29 RX ORDER — DICYCLOMINE HYDROCHLORIDE 10 MG/1
1-2 TABLETS CAPSULE ORAL
Qty: 60 | Refills: 3 | Status: ACTIVE | COMMUNITY
Start: 2022-08-12 | End: 2022-12-09

## 2022-09-29 RX ORDER — FINASTERIDE 5 MG/1
1 TABLET TABLET, FILM COATED ORAL ONCE A DAY
Status: ACTIVE | COMMUNITY

## 2022-09-29 RX ORDER — PREGABALIN 75 MG/1
1 CAPSULE CAPSULE ORAL TWICE A DAY
Status: ACTIVE | COMMUNITY

## 2022-09-29 RX ORDER — OLMESARTAN MEDOXOMIL 40 MG/1
1 TABLET TABLET, FILM COATED ORAL ONCE A DAY
Status: ACTIVE | COMMUNITY

## 2022-09-29 RX ORDER — RIVAROXABAN 20 MG/1
1 TABLET WITH FOOD TABLET, FILM COATED ORAL ONCE A DAY
Status: ACTIVE | COMMUNITY

## 2022-09-29 RX ORDER — LISINOPRIL 40 MG/1
1 TABLET TABLET ORAL ONCE A DAY
Status: ACTIVE | COMMUNITY

## 2022-09-29 RX ORDER — HUMAN INSULIN 100 [IU]/ML
30 UNITS INJECTION, SUSPENSION SUBCUTANEOUS BID
Status: ACTIVE | COMMUNITY

## 2022-09-29 RX ORDER — GLIPIZIDE 5 MG/1
1 TABLET 30 MINUTES BEFORE BREAKFAST TABLET ORAL ONCE A DAY
Status: ACTIVE | COMMUNITY

## 2022-09-29 RX ORDER — TAMSULOSIN HYDROCHLORIDE 0.4 MG/1
1 CAPSULE CAPSULE ORAL ONCE A DAY
Status: ACTIVE | COMMUNITY

## 2022-09-29 RX ORDER — TENOFOVIR ALAFENAMIDE 25 MG/1
TAKE 1 TABLET BY MOUTH DAILY TABLET ORAL ONCE A DAY
Qty: 30 | Refills: 1 | Status: ACTIVE | COMMUNITY

## 2022-09-29 RX ORDER — METOPROLOL TARTRATE 100 MG/1
1 TABLET WITH FOOD TABLET, FILM COATED ORAL TWICE A DAY
Status: ACTIVE | COMMUNITY

## 2022-09-29 RX ORDER — GLIMEPIRIDE 4 MG/1
1 TABLET WITH BREAKFAST OR THE FIRST MAIN MEAL OF THE DAY TABLET ORAL ONCE A DAY
Status: ACTIVE | COMMUNITY

## 2022-09-29 RX ORDER — FLUTICASONE FUROATE, UMECLIDINIUM BROMIDE AND VILANTEROL TRIFENATATE 100; 62.5; 25 UG/1; UG/1; UG/1
1 PUFF POWDER RESPIRATORY (INHALATION) ONCE A DAY
Status: ACTIVE | COMMUNITY

## 2022-09-29 RX ORDER — SILDENAFIL 100 MG/1
1 TABLET AS NEEDED TABLET, FILM COATED ORAL ONCE A DAY
Status: ACTIVE | COMMUNITY

## 2022-10-03 ENCOUNTER — LAB OUTSIDE AN ENCOUNTER (OUTPATIENT)
Dept: URBAN - METROPOLITAN AREA CLINIC 92 | Facility: CLINIC | Age: 62
End: 2022-10-03

## 2022-10-03 ENCOUNTER — OFFICE VISIT (OUTPATIENT)
Dept: URBAN - METROPOLITAN AREA CLINIC 86 | Facility: CLINIC | Age: 62
End: 2022-10-03

## 2022-10-03 ENCOUNTER — ERX REFILL RESPONSE (OUTPATIENT)
Dept: URBAN - METROPOLITAN AREA CLINIC 105 | Facility: CLINIC | Age: 62
End: 2022-10-03

## 2022-10-03 RX ORDER — PANTOPRAZOLE 40 MG/1
TAKE 1 TABLET BY MOUTH ONCE DAILY TABLET, DELAYED RELEASE ORAL
Qty: 90 TABLET | Refills: 1 | OUTPATIENT

## 2022-10-03 RX ORDER — PANTOPRAZOLE 40 MG/1
TAKE 1 TABLET BY MOUTH ONCE DAILY TABLET, DELAYED RELEASE ORAL
Qty: 90 TABLET | Refills: 0 | OUTPATIENT

## 2022-10-07 ENCOUNTER — CLAIMS CREATED FROM THE CLAIM WINDOW (OUTPATIENT)
Dept: URBAN - METROPOLITAN AREA CLINIC 86 | Facility: CLINIC | Age: 62
End: 2022-10-07
Payer: COMMERCIAL

## 2022-10-07 ENCOUNTER — WEB ENCOUNTER (OUTPATIENT)
Dept: URBAN - METROPOLITAN AREA CLINIC 86 | Facility: CLINIC | Age: 62
End: 2022-10-07

## 2022-10-07 VITALS
HEART RATE: 70 BPM | DIASTOLIC BLOOD PRESSURE: 93 MMHG | WEIGHT: 170.5 LBS | HEIGHT: 68 IN | BODY MASS INDEX: 25.84 KG/M2 | TEMPERATURE: 96.8 F | SYSTOLIC BLOOD PRESSURE: 152 MMHG

## 2022-10-07 DIAGNOSIS — I42.2 HYPERTROPHIC CARDIOMYOPATHY: ICD-10-CM

## 2022-10-07 DIAGNOSIS — I48.91 ATRIAL FIBRILLATION: ICD-10-CM

## 2022-10-07 DIAGNOSIS — R10.9 ABDOMINAL PAIN, UNSPECIFIED ABDOMINAL LOCATION: ICD-10-CM

## 2022-10-07 DIAGNOSIS — Z71.89 VACCINE COUNSELING: ICD-10-CM

## 2022-10-07 DIAGNOSIS — M06.9 RHEUMATOID ARTHRITIS: ICD-10-CM

## 2022-10-07 DIAGNOSIS — B18.2 CHRONIC HEPATITIS C: ICD-10-CM

## 2022-10-07 DIAGNOSIS — R89.4 HEPATITIS B CORE ANTIBODY POSITIVE: ICD-10-CM

## 2022-10-07 DIAGNOSIS — M34.9 SCLERODERMA: ICD-10-CM

## 2022-10-07 DIAGNOSIS — Z79.899 HIGH RISK MEDICATION USE: ICD-10-CM

## 2022-10-07 DIAGNOSIS — Z86.010 HISTORY OF COLON POLYPS: ICD-10-CM

## 2022-10-07 DIAGNOSIS — J44.9 COPD (CHRONIC OBSTRUCTIVE PULMONARY DISEASE): ICD-10-CM

## 2022-10-07 DIAGNOSIS — Z79.01 ANTICOAGULANT LONG-TERM USE: ICD-10-CM

## 2022-10-07 DIAGNOSIS — R76.8 HEPATITIS B CORE ANTIBODY POSITIVE: ICD-10-CM

## 2022-10-07 PROCEDURE — 99214 OFFICE O/P EST MOD 30 MIN: CPT | Performed by: PHYSICIAN ASSISTANT

## 2022-10-07 RX ORDER — HUMAN INSULIN 100 [IU]/ML
30 UNITS INJECTION, SUSPENSION SUBCUTANEOUS BID
Status: ACTIVE | COMMUNITY

## 2022-10-07 RX ORDER — HYDROCODONE BITARTRATE AND ACETAMINOPHEN 5; 325 MG/1; MG/1
1 TABLET AS NEEDED TABLET ORAL
Status: ACTIVE | COMMUNITY

## 2022-10-07 RX ORDER — LISINOPRIL 40 MG/1
1 TABLET TABLET ORAL ONCE A DAY
Status: ACTIVE | COMMUNITY

## 2022-10-07 RX ORDER — GLIPIZIDE 5 MG/1
1 TABLET 30 MINUTES BEFORE BREAKFAST TABLET ORAL ONCE A DAY
Status: ACTIVE | COMMUNITY

## 2022-10-07 RX ORDER — TAMSULOSIN HYDROCHLORIDE 0.4 MG/1
1 CAPSULE CAPSULE ORAL ONCE A DAY
Status: ACTIVE | COMMUNITY

## 2022-10-07 RX ORDER — SILDENAFIL 100 MG/1
1 TABLET AS NEEDED TABLET, FILM COATED ORAL ONCE A DAY
Status: ACTIVE | COMMUNITY

## 2022-10-07 RX ORDER — GLIMEPIRIDE 4 MG/1
1 TABLET WITH BREAKFAST OR THE FIRST MAIN MEAL OF THE DAY TABLET ORAL ONCE A DAY
Status: ACTIVE | COMMUNITY

## 2022-10-07 RX ORDER — PANTOPRAZOLE 40 MG/1
TAKE 1 TABLET BY MOUTH ONCE DAILY TABLET, DELAYED RELEASE ORAL
Qty: 90 TABLET | Refills: 0 | Status: ACTIVE | COMMUNITY

## 2022-10-07 RX ORDER — TENOFOVIR ALAFENAMIDE 25 MG/1
TAKE 1 TABLET BY MOUTH DAILY TABLET ORAL ONCE A DAY
Qty: 30 | Refills: 1 | Status: ACTIVE | COMMUNITY

## 2022-10-07 RX ORDER — DICYCLOMINE HYDROCHLORIDE 10 MG/1
1-2 TABLETS CAPSULE ORAL
Qty: 60 | Refills: 3 | Status: ACTIVE | COMMUNITY
Start: 2022-08-12 | End: 2022-12-09

## 2022-10-07 RX ORDER — OLMESARTAN MEDOXOMIL 40 MG/1
1 TABLET TABLET, FILM COATED ORAL ONCE A DAY
Status: ACTIVE | COMMUNITY

## 2022-10-07 RX ORDER — FINASTERIDE 5 MG/1
1 TABLET TABLET, FILM COATED ORAL ONCE A DAY
Status: ACTIVE | COMMUNITY

## 2022-10-07 RX ORDER — RIVAROXABAN 20 MG/1
1 TABLET WITH FOOD TABLET, FILM COATED ORAL ONCE A DAY
Status: ACTIVE | COMMUNITY

## 2022-10-07 RX ORDER — METOPROLOL TARTRATE 100 MG/1
1 TABLET WITH FOOD TABLET, FILM COATED ORAL TWICE A DAY
Status: ACTIVE | COMMUNITY

## 2022-10-07 RX ORDER — FLUTICASONE FUROATE, UMECLIDINIUM BROMIDE AND VILANTEROL TRIFENATATE 100; 62.5; 25 UG/1; UG/1; UG/1
1 PUFF POWDER RESPIRATORY (INHALATION) ONCE A DAY
Status: ACTIVE | COMMUNITY

## 2022-10-07 RX ORDER — PREGABALIN 75 MG/1
1 CAPSULE CAPSULE ORAL TWICE A DAY
Status: ACTIVE | COMMUNITY

## 2022-10-07 NOTE — HPI-TODAY'S VISIT:
Patient is a 62-year-old male being referred in by Dr. Anthony Lea for hepatitis B prophylaxis for b core status and impending rituximab for rheumatoid arthritis.  A copy ofm the note will be sent to the referring provider.  10/7/22 office visit Pt urgently started on vemlidy due to needing RA, scleroderma tx.  Started the vemlidy on Monday 10/3/22 They are planning the treatmeant 1st date on the 18th of the month Did the hep b vaccine   No recently labs will update today    RECAP Dr Hairston [phone]   September 15 labs show no immunity to hepatitis A and that is something long-term to look at getting specifically the hepatitis A vaccine series (2 doses 6m apart)  to protect you against this. Hepatitis C PCR less than 15 and not mentioned as being detected.   Hep B surface antigen negative.  No immunity seen to hepatitis B with regards to surface antibody but the hep B core total was positive and that would indicate a previous exposure to hepatitis B and if you were to receive a single dose of the hep B vaccine, in theory you should form a memory cell induced antibody response to that. Will not prevent need to hep b prevention meds with more potent immunosuppressants. INR elevated at 1.4.  Glucose slightly up at 110 previously 130.  BUN of 18 creatinine 1.15 down from 1.24 previously.  Sodium 142 potassium 4.0 albumin 4.4 bilirubin 0.4 alkaline phosphatase 79 AST of 18 and ALT of 22.  Previously AST 20 and ALT 21. White blood cell count 5.2 hemoglobin 14 plate count 164 and MCV 90.2.  The mean corpuscular hemoglobin concentration was slightly up at 36.1 with normal being from 32 up to 36.  Previously last month it had been normal which would suggest possibly a hydrational state affect. Neutrophils normal at 3666 and lymphocytes normal at 905  RECAP The notes that sent showed that he was hep C treated in 2019 and around then also confirmed B core positive.  The hep b core pos in spme studies with rituximab 3-25% risk to reactivate if not treated to prevent this and the reactivation carries a risk of mortality.  The option is to treat the pt with this high risk med is to tx.  We reviewed a variety of records including some [place] records as well as local records regarding this patient.  Patient listed as having an allergy to contrast based media specifically iodine base that causes hives.  Medicines listed include metoprolol 100 mg once a day, olmesartan 40 mg a day, Xarelto 20 mg a day, Novolin 70/30 30 units twice a day as needed, lisinopril 40mg po qd. acetaminophen with hydrocodone 325/5 with every 4 hours as needed, finasteride 5 mg a day, Trelegy Ellipta 100/62.5/25 1 puff daily.  Glimepiride 4 mg a day.  Pantoprazole 40 mg a day.  Pregabalin 75 mg a day.  Plaquenil. Tamsulosin 0.4 mg a day.  Dr Weeks note sept 2021: Patient has persistent atrial fibrillation and had early recurrence despite cardioversion while on amiodarone.  Ablation was being discussed. Ablation in the setting of hypertrophic cardiomyopathy is more difficult and not as optimal per his note.  Mentions EGD and colonoscopy in June 2021 were unremarkable.  June 30, 2021 EGD with Dr. Facundo Merlos showed esophagus normal and some patchy mild erythematous mucosa in the gastric body and gastric antrum.  June 30, 2021 colonoscopy revealed internal hemorrhoids that were grade 1 and  2 mm polyp in ascending colon.  Path showed fragments of tubular adenoma.  Patient had November 18,2019 ultrasound of the liver showed normal echogenicity with a 9 x 7 x 7 mm ovoid echogenic lesions in the posterior right lobe previously 8 x 8 x 10 mm likely hemangioma.  Liver vessels were patent.  Gallbladder was normal.  Pancreas normal.  Right kidney 9.9 cm.  Elastography was 8.76 Kpa f1-2 range  June 24 2022 patient last seen by cardiology with an episode of near syncope.  Did not go to the emergency room.  They interrogated his defibrillator and revealed a V. tach episode treated with therapy.  Social history no alcohol does smoke 7 to 8 cigarettes a day and trying to stop but not able to do so.  Family history Father with diabetes and hypertension.  Past medical history apical variant hypertrophic cardiomyopathy, cardiac defibrillator in place, chronic hep C, history of cirrhosis, diabetes, hypertension, mitral valve prolapse, post ablation atrial fibrillation.  Tobacco use.  V. tach.  Past surgeries include cholecystectomy 2012, ICD in 2004, colonoscopy 2014 and 2019.  EGD: 2020.   Egd and colon 2021. Cardioversion March 2021.  September 2021 labs show sodium 140 potassium 4.2 glucose 134 BUN of 16 creatinine 1.19 White blood cell count 5.5 hemoglobin 14.9 plate count 172.  August 31, 2021 INR 2.59.  Need to get labs now and check b core and bs ab and hcv pcr and we need to see how renal function.  Dr Lea is waiting for eval to start the rituximab to start.  Discussed the risk for this.

## 2022-11-04 ENCOUNTER — LAB OUTSIDE AN ENCOUNTER (OUTPATIENT)
Dept: URBAN - METROPOLITAN AREA CLINIC 86 | Facility: CLINIC | Age: 62
End: 2022-11-04

## 2022-11-16 ENCOUNTER — ERX REFILL RESPONSE (OUTPATIENT)
Dept: URBAN - METROPOLITAN AREA CLINIC 86 | Facility: CLINIC | Age: 62
End: 2022-11-16

## 2022-11-16 ENCOUNTER — TELEPHONE ENCOUNTER (OUTPATIENT)
Dept: URBAN - METROPOLITAN AREA CLINIC 92 | Facility: CLINIC | Age: 62
End: 2022-11-16

## 2022-11-16 RX ORDER — TENOFOVIR ALAFENAMIDE 25 MG/1
TAKE 1 TABLET BY MOUTH DAILY TABLET ORAL ONCE A DAY
Qty: 30 | Refills: 1 | OUTPATIENT

## 2022-11-16 RX ORDER — TENOFOVIR ALAFENAMIDE 25 MG/1
TAKE 1 TABLET BY MOUTH ONCE DAILY WITH FOOD TABLET ORAL
Qty: 30 TABLET | Refills: 1 | OUTPATIENT

## 2022-11-17 LAB
A/G RATIO: 2
ABSOLUTE BASOPHILS: 20
ABSOLUTE EOSINOPHILS: 70
ABSOLUTE LYMPHOCYTES: 1390
ABSOLUTE MONOCYTES: 505
ABSOLUTE NEUTROPHILS: 3015
ALBUMIN: 4.7
ALKALINE PHOSPHATASE: 75
ALT (SGPT): 19
AST (SGOT): 20
BASOPHILS: 0.4
BILIRUBIN, TOTAL: 0.5
BUN/CREATININE RATIO: (no result)
BUN: 15
CALCIUM: 9.4
CARBON DIOXIDE, TOTAL: 25
CHLORIDE: 104
CREATININE: 1.13
EGFR: 73
EOSINOPHILS: 1.4
GLOBULIN, TOTAL: 2.4
GLUCOSE: 175
HEMATOCRIT: 43.6
HEMOGLOBIN: 15.1
HEPATITIS B VIRUS DNA: <1
HEPATITIS B VIRUS DNA: <10
LYMPHOCYTES: 27.8
MCH: 31.4
MCHC: 34.6
MCV: 90.6
MONOCYTES: 10.1
MPV: 12.4
NEUTROPHILS: 60.3
PLATELET COUNT: 173
POTASSIUM: 4.6
PROTEIN, TOTAL: 7.1
RDW: 12.5
RED BLOOD CELL COUNT: 4.81
SODIUM: 137
WHITE BLOOD CELL COUNT: 5

## 2022-11-21 ENCOUNTER — LAB OUTSIDE AN ENCOUNTER (OUTPATIENT)
Dept: URBAN - METROPOLITAN AREA CLINIC 92 | Facility: CLINIC | Age: 62
End: 2022-11-21

## 2022-11-21 ENCOUNTER — OFFICE VISIT (OUTPATIENT)
Dept: URBAN - METROPOLITAN AREA CLINIC 92 | Facility: CLINIC | Age: 62
End: 2022-11-21
Payer: COMMERCIAL

## 2022-11-21 VITALS
DIASTOLIC BLOOD PRESSURE: 103 MMHG | HEIGHT: 68 IN | HEART RATE: 84 BPM | SYSTOLIC BLOOD PRESSURE: 161 MMHG | BODY MASS INDEX: 26.37 KG/M2 | TEMPERATURE: 96.4 F | WEIGHT: 174 LBS

## 2022-11-21 DIAGNOSIS — R19.5 DARK STOOLS: ICD-10-CM

## 2022-11-21 DIAGNOSIS — R10.84 GENERALIZED ABDOMINAL PAIN: ICD-10-CM

## 2022-11-21 DIAGNOSIS — R19.7 DIARRHEA, UNSPECIFIED TYPE: ICD-10-CM

## 2022-11-21 PROCEDURE — 99214 OFFICE O/P EST MOD 30 MIN: CPT

## 2022-11-21 RX ORDER — FLUTICASONE FUROATE, UMECLIDINIUM BROMIDE AND VILANTEROL TRIFENATATE 100; 62.5; 25 UG/1; UG/1; UG/1
1 PUFF POWDER RESPIRATORY (INHALATION) ONCE A DAY
Status: ACTIVE | COMMUNITY

## 2022-11-21 RX ORDER — SILDENAFIL 100 MG/1
1 TABLET AS NEEDED TABLET, FILM COATED ORAL ONCE A DAY
Status: ACTIVE | COMMUNITY

## 2022-11-21 RX ORDER — PANTOPRAZOLE 40 MG/1
TAKE 1 TABLET BY MOUTH ONCE DAILY TABLET, DELAYED RELEASE ORAL
Qty: 90 TABLET | Refills: 0 | Status: ACTIVE | COMMUNITY

## 2022-11-21 RX ORDER — TAMSULOSIN HYDROCHLORIDE 0.4 MG/1
1 CAPSULE CAPSULE ORAL ONCE A DAY
Status: ACTIVE | COMMUNITY

## 2022-11-21 RX ORDER — PREGABALIN 75 MG/1
1 CAPSULE CAPSULE ORAL TWICE A DAY
Status: ACTIVE | COMMUNITY

## 2022-11-21 RX ORDER — GLIMEPIRIDE 4 MG/1
1 TABLET WITH BREAKFAST OR THE FIRST MAIN MEAL OF THE DAY TABLET ORAL ONCE A DAY
Status: ACTIVE | COMMUNITY

## 2022-11-21 RX ORDER — LISINOPRIL 40 MG/1
1 TABLET TABLET ORAL ONCE A DAY
Status: ACTIVE | COMMUNITY

## 2022-11-21 RX ORDER — ONDANSETRON HYDROCHLORIDE 4 MG/1
1 TABLET TABLET, FILM COATED ORAL ONCE A DAY
Status: ACTIVE | COMMUNITY

## 2022-11-21 RX ORDER — FINASTERIDE 5 MG/1
1 TABLET TABLET, FILM COATED ORAL ONCE A DAY
Status: ACTIVE | COMMUNITY

## 2022-11-21 RX ORDER — TENOFOVIR ALAFENAMIDE 25 MG/1
TAKE 1 TABLET BY MOUTH ONCE DAILY WITH FOOD TABLET ORAL
Qty: 30 TABLET | Refills: 1 | Status: ACTIVE | COMMUNITY

## 2022-11-21 RX ORDER — HYDROCODONE BITARTRATE AND ACETAMINOPHEN 5; 325 MG/1; MG/1
1 TABLET AS NEEDED TABLET ORAL
Status: ACTIVE | COMMUNITY

## 2022-11-21 RX ORDER — OLMESARTAN MEDOXOMIL 40 MG/1
1 TABLET TABLET, FILM COATED ORAL ONCE A DAY
Status: ACTIVE | COMMUNITY

## 2022-11-21 RX ORDER — METOPROLOL TARTRATE 100 MG/1
1 TABLET WITH FOOD TABLET, FILM COATED ORAL TWICE A DAY
Status: ACTIVE | COMMUNITY

## 2022-11-21 RX ORDER — RIVAROXABAN 20 MG/1
1 TABLET WITH FOOD TABLET, FILM COATED ORAL ONCE A DAY
Status: ACTIVE | COMMUNITY

## 2022-11-21 RX ORDER — GLIPIZIDE 5 MG/1
1 TABLET 30 MINUTES BEFORE BREAKFAST TABLET ORAL ONCE A DAY
Status: ACTIVE | COMMUNITY

## 2022-11-21 RX ORDER — HUMAN INSULIN 100 [IU]/ML
30 UNITS INJECTION, SUSPENSION SUBCUTANEOUS BID
Status: ACTIVE | COMMUNITY

## 2022-11-21 RX ORDER — CHOLESTYRAMINE 4 G/9G
1 PACKET MIXED WITH WATER OR NON-CARBONATED DRINK POWDER, FOR SUSPENSION ORAL ONCE A DAY
Qty: 30 | OUTPATIENT
Start: 2022-11-21

## 2022-11-21 RX ORDER — DICYCLOMINE HYDROCHLORIDE 10 MG/1
1-2 TABLETS CAPSULE ORAL
Qty: 60 | Refills: 3 | Status: ACTIVE | COMMUNITY
Start: 2022-08-12 | End: 2022-12-09

## 2022-11-21 RX ORDER — LOPERAMIDE HYDROCHLORIDE 2 MG/1
1 CAPSULE AS NEEDED CAPSULE ORAL
Status: ACTIVE | COMMUNITY

## 2022-11-21 NOTE — HPI-TODAY'S VISIT:
62-year-old male presents today with complaints of acute diarrhea and generalized abdominal pain.  He was seen at [facility] on 11- a CT scan was obtained w/o contrast which showed diffuse urinary bladder wall thickening.  Correlate with urinalysis for acute cystitis, fluid-filled ascending colon reflecting known diarrhea status.  CBC, lipase, CMP all normal, Urine showed some blood. His BP was also noted to be elevated. Currently states his symptoms started about 10 days ago where he had increased stomach irritation and started having loose watery diarrhea.  He has been having bowel movements 6-7 times a day and notes his stool is dark but denies any bright red blood.  He notes increased urgency and has nighttime symptoms multiple times of the night.  He has tried loperamide with minimal reliefAbdominal cramping is all over and is constant.  Having a bowel movement makes it feel better for a few seconds but pain then returns.  He denies any new medications or antibiotics since onset.  No recent hospitalizations or travel outside of country.  He does note he was discharged with amoxicillin due to a sinus infection which she stopped taking.  He notes he stopped taking all of his medications for the last several days because he does not know if his medications are causing his symptoms. He also notes some nausea which she is taking Zofran for which helps.  Denies vomiting, dysphagia, odynophagia, GERD.  Denies chronic NSAID use.  Patient is seen liver center due to positive hep B core antibody positive he is currently on Vemlidy due to needing to be on rituximab for RA and scleroderma treatment. he started this overa month ago.  EGD 6-:Normal negative H. pylori Colonoscopy 6/:Internal hemorrhoids, 2 mm tubular adenoma in ascending repeat in 5 years Last CT scan with and without contrast with us 8- no evidence of acute intra-abdominal process

## 2022-11-21 NOTE — PHYSICAL EXAM GASTROINTESTINAL
Abdomen,  soft, nontender, nondistended,  no guarding or rigidity,  no masses palpable,  normal bowel sounds Liver and Spleen,no hepatosplenomegaly Rectal Chaperone present  , no bleeding present Hemoccult negative

## 2022-11-25 LAB — GASTROINTESTINAL PATHOGEN: (no result)

## 2022-12-01 ENCOUNTER — CLAIMS CREATED FROM THE CLAIM WINDOW (OUTPATIENT)
Dept: URBAN - METROPOLITAN AREA CLINIC 86 | Facility: CLINIC | Age: 62
End: 2022-12-01
Payer: COMMERCIAL

## 2022-12-01 VITALS
SYSTOLIC BLOOD PRESSURE: 157 MMHG | DIASTOLIC BLOOD PRESSURE: 83 MMHG | TEMPERATURE: 97.1 F | WEIGHT: 179 LBS | HEART RATE: 72 BPM | BODY MASS INDEX: 27.13 KG/M2 | HEIGHT: 68 IN

## 2022-12-01 DIAGNOSIS — Z79.899 HIGH RISK MEDICATION USE: ICD-10-CM

## 2022-12-01 DIAGNOSIS — M06.9 RHEUMATOID ARTHRITIS: ICD-10-CM

## 2022-12-01 DIAGNOSIS — R89.4 HEPATITIS B CORE ANTIBODY POSITIVE: ICD-10-CM

## 2022-12-01 DIAGNOSIS — R76.8 HEPATITIS B CORE ANTIBODY POSITIVE: ICD-10-CM

## 2022-12-01 PROBLEM — 736687002 HEPATITIS B CORE ANTIBODY POSITIVE: Status: ACTIVE | Noted: 2022-09-05

## 2022-12-01 PROBLEM — 49436004 ATRIAL FIBRILLATION: Status: ACTIVE | Noted: 2022-08-11

## 2022-12-01 PROBLEM — 128302006 CHRONIC HEPATITIS C: Status: ACTIVE | Noted: 2022-08-11

## 2022-12-01 PROBLEM — 422801000 SCLERODERMA: Status: ACTIVE | Noted: 2022-09-23

## 2022-12-01 PROBLEM — 233873004 HYPERTROPHIC CARDIOMYOPATHY: Status: ACTIVE | Noted: 2022-09-05

## 2022-12-01 PROBLEM — 428283002 HISTORY OF POLYP OF COLON: Status: ACTIVE | Noted: 2020-09-30

## 2022-12-01 PROBLEM — 69896004 RHEUMATOID ARTHRITIS: Status: ACTIVE | Noted: 2022-09-05

## 2022-12-01 PROBLEM — 13645005 COPD - CHRONIC OBSTRUCTIVE PULMONARY DISEASE: Status: ACTIVE | Noted: 2022-08-11

## 2022-12-01 PROBLEM — 409063005 COUNSELING: Status: ACTIVE | Noted: 2022-08-11

## 2022-12-01 PROBLEM — 161646004 H/O: HIGH RISK MEDICATION: Status: ACTIVE | Noted: 2022-08-11

## 2022-12-01 PROBLEM — 21522001: Status: ACTIVE | Noted: 2020-07-14

## 2022-12-01 PROBLEM — 711150003 LONG-TERM CURRENT USE OF ANTICOAGULANT: Status: ACTIVE | Noted: 2022-09-05

## 2022-12-01 PROCEDURE — 99214 OFFICE O/P EST MOD 30 MIN: CPT | Performed by: PHYSICIAN ASSISTANT

## 2022-12-01 RX ORDER — PANTOPRAZOLE 40 MG/1
TAKE 1 TABLET BY MOUTH ONCE DAILY TABLET, DELAYED RELEASE ORAL
Qty: 90 TABLET | Refills: 0 | Status: ACTIVE | COMMUNITY

## 2022-12-01 RX ORDER — HYDROCODONE BITARTRATE AND ACETAMINOPHEN 5; 325 MG/1; MG/1
1 TABLET AS NEEDED TABLET ORAL
Status: ACTIVE | COMMUNITY

## 2022-12-01 RX ORDER — TENOFOVIR ALAFENAMIDE 25 MG/1
TAKE 1 TABLET BY MOUTH ONCE DAILY WITH FOOD TABLET ORAL
Qty: 30 TABLET | Refills: 1 | Status: ACTIVE | COMMUNITY

## 2022-12-01 RX ORDER — OLMESARTAN MEDOXOMIL 40 MG/1
1 TABLET TABLET, FILM COATED ORAL ONCE A DAY
Status: ACTIVE | COMMUNITY

## 2022-12-01 RX ORDER — CHOLESTYRAMINE 4 G/9G
1 PACKET MIXED WITH WATER OR NON-CARBONATED DRINK POWDER, FOR SUSPENSION ORAL ONCE A DAY
Qty: 30 | Status: ACTIVE | COMMUNITY
Start: 2022-11-21

## 2022-12-01 RX ORDER — FLUTICASONE FUROATE, UMECLIDINIUM BROMIDE AND VILANTEROL TRIFENATATE 100; 62.5; 25 UG/1; UG/1; UG/1
1 PUFF POWDER RESPIRATORY (INHALATION) ONCE A DAY
Status: ACTIVE | COMMUNITY

## 2022-12-01 RX ORDER — RIVAROXABAN 20 MG/1
1 TABLET WITH FOOD TABLET, FILM COATED ORAL ONCE A DAY
Status: ACTIVE | COMMUNITY

## 2022-12-01 RX ORDER — ONDANSETRON HYDROCHLORIDE 4 MG/1
1 TABLET TABLET, FILM COATED ORAL ONCE A DAY
Status: ACTIVE | COMMUNITY

## 2022-12-01 RX ORDER — TAMSULOSIN HYDROCHLORIDE 0.4 MG/1
1 CAPSULE CAPSULE ORAL ONCE A DAY
Status: ACTIVE | COMMUNITY

## 2022-12-01 RX ORDER — LISINOPRIL 40 MG/1
1 TABLET TABLET ORAL ONCE A DAY
Status: ON HOLD | COMMUNITY

## 2022-12-01 RX ORDER — HUMAN INSULIN 100 [IU]/ML
30 UNITS INJECTION, SUSPENSION SUBCUTANEOUS BID
Status: ACTIVE | COMMUNITY

## 2022-12-01 RX ORDER — GLIMEPIRIDE 4 MG/1
1 TABLET WITH BREAKFAST OR THE FIRST MAIN MEAL OF THE DAY TABLET ORAL ONCE A DAY
Status: ACTIVE | COMMUNITY

## 2022-12-01 RX ORDER — FINASTERIDE 5 MG/1
1 TABLET TABLET, FILM COATED ORAL ONCE A DAY
Status: ACTIVE | COMMUNITY

## 2022-12-01 RX ORDER — METOPROLOL TARTRATE 100 MG/1
1 TABLET WITH FOOD TABLET, FILM COATED ORAL TWICE A DAY
Status: ACTIVE | COMMUNITY

## 2022-12-01 RX ORDER — SILDENAFIL 100 MG/1
1 TABLET AS NEEDED TABLET, FILM COATED ORAL ONCE A DAY
Status: ACTIVE | COMMUNITY

## 2022-12-01 RX ORDER — PREGABALIN 75 MG/1
1 CAPSULE CAPSULE ORAL TWICE A DAY
Status: ACTIVE | COMMUNITY

## 2022-12-01 RX ORDER — GLIPIZIDE 5 MG/1
1 TABLET 30 MINUTES BEFORE BREAKFAST TABLET ORAL ONCE A DAY
Status: ACTIVE | COMMUNITY

## 2022-12-01 RX ORDER — TENOFOVIR ALAFENAMIDE 25 MG/1
1 TABLET WITH FOOD TABLET ORAL ONCE A DAY
Qty: 90 TABLET | OUTPATIENT
Start: 2022-12-01 | End: 2023-03-01

## 2022-12-01 RX ORDER — DICYCLOMINE HYDROCHLORIDE 10 MG/1
1-2 TABLETS CAPSULE ORAL
Qty: 60 | Refills: 3 | Status: ACTIVE | COMMUNITY
Start: 2022-08-12 | End: 2022-12-09

## 2022-12-01 RX ORDER — LOPERAMIDE HYDROCHLORIDE 2 MG/1
1 CAPSULE AS NEEDED CAPSULE ORAL
Status: ACTIVE | COMMUNITY

## 2022-12-01 NOTE — HPI-TODAY'S VISIT:
Patient is a 62-year-old male being referred in by Dr. Anthony Lea for hepatitis B prophylaxis for b core status and impending rituximab for rheumatoid arthritis.  A copy ofm the note will be sent to the referring provider. 12/1/22 Pt here today to make sure his pain is not due to the liver, of note he was seen by GI recently and this was part of their note:   He was seen at [facility] on 11- a CT scan was obtained w/o contrast which showed diffuse urinary bladder wall thickening.  Correlate with urinalysis for acute cystitis, fluid-filled ascending colon reflecting known diarrhea status.  CBC, lipase, CMP all normal, Urine showed some blood. His BP was also noted to be elevated. Currently states his symptoms started about 10 days ago where he had increased stomach irritation and started having loose watery diarrhea.  He has been having bowel movements 6-7 times a day and notes his stool is dark but denies any bright red blood.  He notes increased urgency and has nighttime symptoms multiple times of the night.    Since then the diarrhea has resolved and the bacterial testing was negative. HE is still having epigastric pain and notes ibna tit is better with food on his stomach Discussed do not think the vemlidy is related to his pain. His liver enzynmes are normal. HBV negative CT scan in hospital did not have contrast but the one in august did and one small 6 mm area and we will check on this in feb with a US. 10/7/22 office visit Pt urgently started on vemlidy due to needing RA, scleroderma tx.  Started the vemlidy on Monday 10/3/22 They are planning the treatmeant 1st date on the 18th of the month Did the hep b vaccine   No recently labs will update today    RECAP Dr Hairston [phone]   September 15 labs show no immunity to hepatitis A and that is something long-term to look at getting specifically the hepatitis A vaccine series (2 doses 6m apart)  to protect you against this. Hepatitis C PCR less than 15 and not mentioned as being detected.   Hep B surface antigen negative.  No immunity seen to hepatitis B with regards to surface antibody but the hep B core total was positive and that would indicate a previous exposure to hepatitis B and if you were to receive a single dose of the hep B vaccine, in theory you should form a memory cell induced antibody response to that. Will not prevent need to hep b prevention meds with more potent immunosuppressants. INR elevated at 1.4.  Glucose slightly up at 110 previously 130.  BUN of 18 creatinine 1.15 down from 1.24 previously.  Sodium 142 potassium 4.0 albumin 4.4 bilirubin 0.4 alkaline phosphatase 79 AST of 18 and ALT of 22.  Previously AST 20 and ALT 21. White blood cell count 5.2 hemoglobin 14 plate count 164 and MCV 90.2.  The mean corpuscular hemoglobin concentration was slightly up at 36.1 with normal being from 32 up to 36.  Previously last month it had been normal which would suggest possibly a hydrational state affect. Neutrophils normal at 3666 and lymphocytes normal at 905  RECAP The notes that sent showed that he was hep C treated in 2019 and around then also confirmed B core positive.  The hep b core pos in spme studies with rituximab 3-25% risk to reactivate if not treated to prevent this and the reactivation carries a risk of mortality.  The option is to treat the pt with this high risk med is to tx.  We reviewed a variety of records including some [place] records as well as local records regarding this patient.  Patient listed as having an allergy to contrast based media specifically iodine base that causes hives.  Medicines listed include metoprolol 100 mg once a day, olmesartan 40 mg a day, Xarelto 20 mg a day, Novolin 70/30 30 units twice a day as needed, lisinopril 40mg po qd. acetaminophen with hydrocodone 325/5 with every 4 hours as needed, finasteride 5 mg a day, Trelegy Ellipta 100/62.5/25 1 puff daily.  Glimepiride 4 mg a day.  Pantoprazole 40 mg a day.  Pregabalin 75 mg a day.  Plaquenil. Tamsulosin 0.4 mg a day.  Dr Weeks note sept 2021: Patient has persistent atrial fibrillation and had early recurrence despite cardioversion while on amiodarone.  Ablation was being discussed. Ablation in the setting of hypertrophic cardiomyopathy is more difficult and not as optimal per his note.  Mentions EGD and colonoscopy in June 2021 were unremarkable.  June 30, 2021 EGD with Dr. Facundo Merlos showed esophagus normal and some patchy mild erythematous mucosa in the gastric body and gastric antrum.  June 30, 2021 colonoscopy revealed internal hemorrhoids that were grade 1 and  2 mm polyp in ascending colon.  Path showed fragments of tubular adenoma.  Patient had November 18,2019 ultrasound of the liver showed normal echogenicity with a 9 x 7 x 7 mm ovoid echogenic lesions in the posterior right lobe previously 8 x 8 x 10 mm likely hemangioma.  Liver vessels were patent.  Gallbladder was normal.  Pancreas normal.  Right kidney 9.9 cm.  Elastography was 8.76 Kpa f1-2 range  June 24 2022 patient last seen by cardiology with an episode of near syncope.  Did not go to the emergency room.  They interrogated his defibrillator and revealed a V. tach episode treated with therapy.  Social history no alcohol does smoke 7 to 8 cigarettes a day and trying to stop but not able to do so.  Family history Father with diabetes and hypertension.  Past medical history apical variant hypertrophic cardiomyopathy, cardiac defibrillator in place, chronic hep C, history of cirrhosis, diabetes, hypertension, mitral valve prolapse, post ablation atrial fibrillation.  Tobacco use.  V. tach.  Past surgeries include cholecystectomy 2012, ICD in 2004, colonoscopy 2014 and 2019.  EGD: 2020.   Egd and colon 2021. Cardioversion March 2021.  September 2021 labs show sodium 140 potassium 4.2 glucose 134 BUN of 16 creatinine 1.19 White blood cell count 5.5 hemoglobin 14.9 plate count 172.  August 31, 2021 INR 2.59.  Need to get labs now and check b core and bs ab and hcv pcr and we need to see how renal function.  Dr Lea is waiting for eval to start the rituximab to start.  Discussed the risk for this.

## 2022-12-02 ENCOUNTER — OFFICE VISIT (OUTPATIENT)
Dept: URBAN - METROPOLITAN AREA CLINIC 86 | Facility: CLINIC | Age: 62
End: 2022-12-02

## 2022-12-07 ENCOUNTER — LAB OUTSIDE AN ENCOUNTER (OUTPATIENT)
Dept: URBAN - METROPOLITAN AREA CLINIC 86 | Facility: CLINIC | Age: 62
End: 2022-12-07

## 2022-12-07 ENCOUNTER — OFFICE VISIT (OUTPATIENT)
Dept: URBAN - METROPOLITAN AREA CLINIC 86 | Facility: CLINIC | Age: 62
End: 2022-12-07

## 2022-12-22 ENCOUNTER — OFFICE VISIT (OUTPATIENT)
Dept: URBAN - METROPOLITAN AREA CLINIC 92 | Facility: CLINIC | Age: 62
End: 2022-12-22

## 2022-12-23 ENCOUNTER — OFFICE VISIT (OUTPATIENT)
Dept: URBAN - METROPOLITAN AREA CLINIC 92 | Facility: CLINIC | Age: 62
End: 2022-12-23
Payer: COMMERCIAL

## 2022-12-23 VITALS
DIASTOLIC BLOOD PRESSURE: 67 MMHG | SYSTOLIC BLOOD PRESSURE: 113 MMHG | HEIGHT: 68 IN | HEART RATE: 80 BPM | TEMPERATURE: 96.4 F | BODY MASS INDEX: 26.67 KG/M2 | WEIGHT: 176 LBS

## 2022-12-23 DIAGNOSIS — K21.9 GASTROESOPHAGEAL REFLUX DISEASE WITHOUT ESOPHAGITIS: ICD-10-CM

## 2022-12-23 DIAGNOSIS — R10.13 EPIGASTRIC BURNING SENSATION: ICD-10-CM

## 2022-12-23 DIAGNOSIS — R19.7 DIARRHEA, UNSPECIFIED TYPE: ICD-10-CM

## 2022-12-23 PROBLEM — 266435005: Status: ACTIVE | Noted: 2022-12-23

## 2022-12-23 PROCEDURE — 99213 OFFICE O/P EST LOW 20 MIN: CPT

## 2022-12-23 RX ORDER — SILDENAFIL 100 MG/1
1 TABLET AS NEEDED TABLET, FILM COATED ORAL ONCE A DAY
Status: ACTIVE | COMMUNITY

## 2022-12-23 RX ORDER — PREGABALIN 75 MG/1
1 CAPSULE CAPSULE ORAL TWICE A DAY
Status: ACTIVE | COMMUNITY

## 2022-12-23 RX ORDER — HUMAN INSULIN 100 [IU]/ML
30 UNITS INJECTION, SUSPENSION SUBCUTANEOUS BID
Status: ACTIVE | COMMUNITY

## 2022-12-23 RX ORDER — ONDANSETRON HYDROCHLORIDE 4 MG/1
1 TABLET TABLET, FILM COATED ORAL ONCE A DAY
Status: ACTIVE | COMMUNITY

## 2022-12-23 RX ORDER — TAMSULOSIN HYDROCHLORIDE 0.4 MG/1
1 CAPSULE CAPSULE ORAL ONCE A DAY
Status: ACTIVE | COMMUNITY

## 2022-12-23 RX ORDER — OLMESARTAN MEDOXOMIL 40 MG/1
1 TABLET TABLET, FILM COATED ORAL ONCE A DAY
Status: ACTIVE | COMMUNITY

## 2022-12-23 RX ORDER — FLUTICASONE FUROATE, UMECLIDINIUM BROMIDE AND VILANTEROL TRIFENATATE 100; 62.5; 25 UG/1; UG/1; UG/1
1 PUFF POWDER RESPIRATORY (INHALATION) ONCE A DAY
Status: ACTIVE | COMMUNITY

## 2022-12-23 RX ORDER — LISINOPRIL 40 MG/1
1 TABLET TABLET ORAL ONCE A DAY
Status: ON HOLD | COMMUNITY

## 2022-12-23 RX ORDER — PANTOPRAZOLE 40 MG/1
TAKE 1 TABLET BY MOUTH ONCE DAILY TABLET, DELAYED RELEASE ORAL
Qty: 90 TABLET | Refills: 0 | Status: ACTIVE | COMMUNITY

## 2022-12-23 RX ORDER — CHOLESTYRAMINE 4 G/9G
1 PACKET MIXED WITH WATER OR NON-CARBONATED DRINK POWDER, FOR SUSPENSION ORAL ONCE A DAY
Qty: 30 | Status: ACTIVE | COMMUNITY
Start: 2022-11-21

## 2022-12-23 RX ORDER — TENOFOVIR ALAFENAMIDE 25 MG/1
TAKE 1 TABLET BY MOUTH ONCE DAILY WITH FOOD TABLET ORAL
Qty: 30 TABLET | Refills: 1 | Status: ACTIVE | COMMUNITY

## 2022-12-23 RX ORDER — LOPERAMIDE HYDROCHLORIDE 2 MG/1
1 CAPSULE AS NEEDED CAPSULE ORAL
Status: ACTIVE | COMMUNITY

## 2022-12-23 RX ORDER — GLIPIZIDE 5 MG/1
1 TABLET 30 MINUTES BEFORE BREAKFAST TABLET ORAL ONCE A DAY
Status: ACTIVE | COMMUNITY

## 2022-12-23 RX ORDER — PANTOPRAZOLE SODIUM 40 MG/1
1 TABLET TABLET, DELAYED RELEASE ORAL ONCE A DAY
Qty: 90 | Refills: 1 | OUTPATIENT
Start: 2022-12-23

## 2022-12-23 RX ORDER — METOPROLOL TARTRATE 100 MG/1
1 TABLET WITH FOOD TABLET, FILM COATED ORAL TWICE A DAY
Status: ACTIVE | COMMUNITY

## 2022-12-23 RX ORDER — TENOFOVIR ALAFENAMIDE 25 MG/1
1 TABLET WITH FOOD TABLET ORAL ONCE A DAY
Qty: 90 TABLET | Status: ACTIVE | COMMUNITY
Start: 2022-12-01 | End: 2023-03-01

## 2022-12-23 RX ORDER — RIVAROXABAN 20 MG/1
1 TABLET WITH FOOD TABLET, FILM COATED ORAL ONCE A DAY
Status: ACTIVE | COMMUNITY

## 2022-12-23 RX ORDER — GLIMEPIRIDE 4 MG/1
1 TABLET WITH BREAKFAST OR THE FIRST MAIN MEAL OF THE DAY TABLET ORAL ONCE A DAY
Status: ACTIVE | COMMUNITY

## 2022-12-23 RX ORDER — FINASTERIDE 5 MG/1
1 TABLET TABLET, FILM COATED ORAL ONCE A DAY
Status: ACTIVE | COMMUNITY

## 2022-12-23 RX ORDER — HYDROCODONE BITARTRATE AND ACETAMINOPHEN 5; 325 MG/1; MG/1
1 TABLET AS NEEDED TABLET ORAL
Status: ACTIVE | COMMUNITY

## 2022-12-23 NOTE — HPI-TODAY'S VISIT:
62-year-old male presents today for f/u.   11/21/22 He was seen at [facility] on 11- a CT scan was obtained w/o contrast which showed diffuse urinary bladder wall thickening.  Correlate with urinalysis for acute cystitis, fluid-filled ascending colon reflecting known diarrhea status.  CBC, lipase, CMP all normal, Urine showed some blood. His BP was also noted to be elevated. Currently states his symptoms started about 10 days ago where he had increased stomach irritation and started having loose watery diarrhea.  He has been having bowel movements 6-7 times a day and notes his stool is dark but denies any bright red blood.  He notes increased urgency and has nighttime symptoms multiple times of the night.  He has tried loperamide with minimal reliefAbdominal cramping is all over and is constant.  Having a bowel movement makes it feel better for a few seconds but pain then returns.  He denies any new medications or antibiotics since onset.  No recent hospitalizations or travel outside of country.  He does note he was discharged with amoxicillin due to a sinus infection which she stopped taking.  He notes he stopped taking all of his medications for the last several days because he does not know if his medications are causing his symptoms. He also notes some nausea which she is taking Zofran for which helps.  Denies vomiting, dysphagia, odynophagia, GERD.  Denies chronic NSAID use.  Patient is seen liver center due to positive hep B core antibody positive he is currently on Vemlidy due to needing to be on rituximab for RA and scleroderma treatment. he started this overa month ago.  EGD 6-:Normal negative H. pylori GES 2020: normal Colonoscopy 6/:Internal hemorrhoids, 2 mm tubular adenoma in ascending repeat in 5 years Last CT scan with and without contrast with us 8- no evidence of acute intra-abdominal process, normal pancreas  he was given questran and gpp was obtained which was negative.   12/23/22 Today patient states his BM are much better. He is having 3 BM daily sometimes normal sometimes looser which has been his normal for a while. He does note some oily stools. He denies any rectal bleeding, melena, nighttime sx, weight loss. Questran did help the diarrhea, but he is not needing this anymore. He has been on Creon in the past for presumed chronic pancreatitis.  He does still have a burning sensation in his epigastric area. He is on pantoprazole 40mg intermittently. Has some intermitent nausea getting better since last visit. He also notes increased burning when he takes his medications on empty stomach so he has been taking them with meals now. He denies any regurg, vomiting, dysphagia or odynophagia. He uses NSAIDs occasionally.  He states his appetite has also been better lately.

## 2022-12-23 NOTE — PHYSICAL EXAM GASTROINTESTINAL
Abdomen,  soft, nontender, nondistended,  no guarding or rigidity,  no masses palpable,  normal bowel sounds Liver and Spleen,no hepatosplenomegaly

## 2023-01-05 ENCOUNTER — ERX REFILL RESPONSE (OUTPATIENT)
Dept: URBAN - METROPOLITAN AREA CLINIC 86 | Facility: CLINIC | Age: 63
End: 2023-01-05

## 2023-01-05 RX ORDER — TENOFOVIR ALAFENAMIDE 25 MG/1
TAKE 1 TABLET BY MOUTH ONCE DAILY WITH FOOD TABLET ORAL
Qty: 30 TABLET | Refills: 1 | OUTPATIENT

## 2023-01-05 RX ORDER — TENOFOVIR ALAFENAMIDE 25 MG/1
TAKE 1 TABLET BY MOUTH ONCE DAILY WITH FOOD TABLET ORAL
Qty: 30 TABLET | Refills: 2 | OUTPATIENT

## 2023-01-05 RX ORDER — TENOFOVIR ALAFENAMIDE 25 MG/1
TAKE 1 TABLET BY MOUTH ONCE DAILY WITH FOOD TABLET ORAL
Qty: 30 TABLET | Refills: 0 | OUTPATIENT

## 2023-01-11 PROBLEM — 736687002 HEPATITIS B CORE ANTIBODY POSITIVE: Status: ACTIVE | Noted: 2022-08-11

## 2023-02-10 ENCOUNTER — OFFICE VISIT (OUTPATIENT)
Dept: URBAN - METROPOLITAN AREA CLINIC 91 | Facility: CLINIC | Age: 63
End: 2023-02-10

## 2023-02-13 ENCOUNTER — LAB OUTSIDE AN ENCOUNTER (OUTPATIENT)
Dept: URBAN - METROPOLITAN AREA CLINIC 86 | Facility: CLINIC | Age: 63
End: 2023-02-13

## 2023-02-19 ENCOUNTER — OUT OF OFFICE VISIT (OUTPATIENT)
Dept: URBAN - METROPOLITAN AREA MEDICAL CENTER 12 | Facility: MEDICAL CENTER | Age: 63
End: 2023-02-19
Payer: COMMERCIAL

## 2023-02-19 DIAGNOSIS — Z86.010 ADENOMAS PERSONAL HISTORY OF COLONIC POLYPS: ICD-10-CM

## 2023-02-19 DIAGNOSIS — K92.1 ACUTE MELENA: ICD-10-CM

## 2023-02-19 DIAGNOSIS — D64.89 ANEMIA DUE TO OTHER CAUSE: ICD-10-CM

## 2023-02-19 PROCEDURE — G8427 DOCREV CUR MEDS BY ELIG CLIN: HCPCS | Performed by: INTERNAL MEDICINE

## 2023-02-19 PROCEDURE — 99222 1ST HOSP IP/OBS MODERATE 55: CPT | Performed by: INTERNAL MEDICINE

## 2023-02-20 ENCOUNTER — OUT OF OFFICE VISIT (OUTPATIENT)
Dept: URBAN - METROPOLITAN AREA MEDICAL CENTER 12 | Facility: MEDICAL CENTER | Age: 63
End: 2023-02-20
Payer: COMMERCIAL

## 2023-02-20 DIAGNOSIS — K92.1 ACUTE MELENA: ICD-10-CM

## 2023-02-20 PROCEDURE — 91110 GI TRC IMG INTRAL ESOPH-ILE: CPT | Performed by: INTERNAL MEDICINE

## 2023-02-20 PROCEDURE — 43235 EGD DIAGNOSTIC BRUSH WASH: CPT | Performed by: INTERNAL MEDICINE

## 2023-02-22 ENCOUNTER — OUT OF OFFICE VISIT (OUTPATIENT)
Dept: URBAN - METROPOLITAN AREA MEDICAL CENTER 12 | Facility: MEDICAL CENTER | Age: 63
End: 2023-02-22
Payer: COMMERCIAL

## 2023-02-22 DIAGNOSIS — R93.3 ABN FINDINGS-GI TRACT: ICD-10-CM

## 2023-02-22 DIAGNOSIS — Z79.01 ANTICOAGULANT LONG-TERM USE: ICD-10-CM

## 2023-02-22 DIAGNOSIS — K92.1 ACUTE MELENA: ICD-10-CM

## 2023-02-22 DIAGNOSIS — D53.8 OTHER SPECIFIED NUTRITIONAL ANEMIAS: ICD-10-CM

## 2023-02-22 PROCEDURE — 99233 SBSQ HOSP IP/OBS HIGH 50: CPT | Performed by: STUDENT IN AN ORGANIZED HEALTH CARE EDUCATION/TRAINING PROGRAM

## 2023-02-28 ENCOUNTER — OFFICE VISIT (OUTPATIENT)
Dept: URBAN - METROPOLITAN AREA CLINIC 92 | Facility: CLINIC | Age: 63
End: 2023-02-28

## 2023-02-28 ENCOUNTER — OFFICE VISIT (OUTPATIENT)
Dept: URBAN - METROPOLITAN AREA CLINIC 91 | Facility: CLINIC | Age: 63
End: 2023-02-28

## 2023-02-28 RX ORDER — HUMAN INSULIN 100 [IU]/ML
30 UNITS INJECTION, SUSPENSION SUBCUTANEOUS BID
COMMUNITY

## 2023-02-28 RX ORDER — ONDANSETRON HYDROCHLORIDE 4 MG/1
1 TABLET TABLET, FILM COATED ORAL ONCE A DAY
COMMUNITY

## 2023-02-28 RX ORDER — GLIMEPIRIDE 4 MG/1
1 TABLET WITH BREAKFAST OR THE FIRST MAIN MEAL OF THE DAY TABLET ORAL ONCE A DAY
COMMUNITY

## 2023-02-28 RX ORDER — LISINOPRIL 40 MG/1
1 TABLET TABLET ORAL ONCE A DAY
COMMUNITY

## 2023-02-28 RX ORDER — GLIPIZIDE 5 MG/1
1 TABLET 30 MINUTES BEFORE BREAKFAST TABLET ORAL ONCE A DAY
COMMUNITY

## 2023-02-28 RX ORDER — RIVAROXABAN 20 MG/1
1 TABLET WITH FOOD TABLET, FILM COATED ORAL ONCE A DAY
COMMUNITY

## 2023-02-28 RX ORDER — PANTOPRAZOLE 40 MG/1
TAKE 1 TABLET BY MOUTH ONCE DAILY TABLET, DELAYED RELEASE ORAL
Qty: 90 TABLET | Refills: 0 | COMMUNITY

## 2023-02-28 RX ORDER — OLMESARTAN MEDOXOMIL 40 MG/1
1 TABLET TABLET, FILM COATED ORAL ONCE A DAY
COMMUNITY

## 2023-02-28 RX ORDER — HYDROCODONE BITARTRATE AND ACETAMINOPHEN 5; 325 MG/1; MG/1
1 TABLET AS NEEDED TABLET ORAL
COMMUNITY

## 2023-02-28 RX ORDER — CHOLESTYRAMINE 4 G/9G
1 PACKET MIXED WITH WATER OR NON-CARBONATED DRINK POWDER, FOR SUSPENSION ORAL ONCE A DAY
Qty: 30 | COMMUNITY
Start: 2022-11-21

## 2023-02-28 RX ORDER — METOPROLOL TARTRATE 100 MG/1
1 TABLET WITH FOOD TABLET, FILM COATED ORAL TWICE A DAY
COMMUNITY

## 2023-02-28 RX ORDER — TAMSULOSIN HYDROCHLORIDE 0.4 MG/1
1 CAPSULE CAPSULE ORAL ONCE A DAY
COMMUNITY

## 2023-02-28 RX ORDER — FLUTICASONE FUROATE, UMECLIDINIUM BROMIDE AND VILANTEROL TRIFENATATE 100; 62.5; 25 UG/1; UG/1; UG/1
1 PUFF POWDER RESPIRATORY (INHALATION) ONCE A DAY
COMMUNITY

## 2023-02-28 RX ORDER — LOPERAMIDE HYDROCHLORIDE 2 MG/1
1 CAPSULE AS NEEDED CAPSULE ORAL
COMMUNITY

## 2023-02-28 RX ORDER — PANTOPRAZOLE SODIUM 40 MG/1
1 TABLET TABLET, DELAYED RELEASE ORAL ONCE A DAY
Qty: 90 | Refills: 1 | COMMUNITY
Start: 2022-12-23

## 2023-02-28 RX ORDER — FINASTERIDE 5 MG/1
1 TABLET TABLET, FILM COATED ORAL ONCE A DAY
COMMUNITY

## 2023-02-28 RX ORDER — PREGABALIN 75 MG/1
1 CAPSULE CAPSULE ORAL TWICE A DAY
COMMUNITY

## 2023-02-28 RX ORDER — TENOFOVIR ALAFENAMIDE 25 MG/1
TAKE 1 TABLET BY MOUTH ONCE DAILY WITH FOOD TABLET ORAL
Qty: 30 TABLET | Refills: 0 | COMMUNITY

## 2023-02-28 RX ORDER — SILDENAFIL 100 MG/1
1 TABLET AS NEEDED TABLET, FILM COATED ORAL ONCE A DAY
COMMUNITY
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
## 2023-03-01 ENCOUNTER — LAB OUTSIDE AN ENCOUNTER (OUTPATIENT)
Dept: URBAN - METROPOLITAN AREA CLINIC 86 | Facility: CLINIC | Age: 63
End: 2023-03-01

## 2023-03-13 ENCOUNTER — OFFICE VISIT (OUTPATIENT)
Dept: URBAN - METROPOLITAN AREA CLINIC 86 | Facility: CLINIC | Age: 63
End: 2023-03-13

## 2023-03-15 ENCOUNTER — OFFICE VISIT (OUTPATIENT)
Dept: URBAN - METROPOLITAN AREA CLINIC 86 | Facility: CLINIC | Age: 63
End: 2023-03-15
Payer: COMMERCIAL

## 2023-03-15 VITALS
HEIGHT: 68 IN | SYSTOLIC BLOOD PRESSURE: 140 MMHG | BODY MASS INDEX: 25.61 KG/M2 | WEIGHT: 169 LBS | TEMPERATURE: 98.1 F | HEART RATE: 67 BPM | DIASTOLIC BLOOD PRESSURE: 82 MMHG

## 2023-03-15 DIAGNOSIS — M34.9 SCLERODERMA: ICD-10-CM

## 2023-03-15 DIAGNOSIS — Z79.899 HIGH RISK MEDICATION USE: ICD-10-CM

## 2023-03-15 DIAGNOSIS — M06.9 RHEUMATOID ARTHRITIS: ICD-10-CM

## 2023-03-15 DIAGNOSIS — R89.4 HEPATITIS B CORE ANTIBODY POSITIVE: ICD-10-CM

## 2023-03-15 PROCEDURE — 99214 OFFICE O/P EST MOD 30 MIN: CPT | Performed by: PHYSICIAN ASSISTANT

## 2023-03-15 RX ORDER — TAMSULOSIN HYDROCHLORIDE 0.4 MG/1
1 CAPSULE CAPSULE ORAL ONCE A DAY
Status: ACTIVE | COMMUNITY

## 2023-03-15 RX ORDER — TENOFOVIR ALAFENAMIDE 25 MG/1
TAKE 1 TABLET BY MOUTH ONCE DAILY WITH FOOD TABLET ORAL
Qty: 90 | Refills: 0

## 2023-03-15 RX ORDER — FLUTICASONE FUROATE, UMECLIDINIUM BROMIDE AND VILANTEROL TRIFENATATE 100; 62.5; 25 UG/1; UG/1; UG/1
1 PUFF POWDER RESPIRATORY (INHALATION) ONCE A DAY
Status: ACTIVE | COMMUNITY

## 2023-03-15 RX ORDER — ONDANSETRON HYDROCHLORIDE 4 MG/1
1 TABLET TABLET, FILM COATED ORAL ONCE A DAY
Status: ON HOLD | COMMUNITY

## 2023-03-15 RX ORDER — LISINOPRIL 40 MG/1
1 TABLET TABLET ORAL ONCE A DAY
Status: ON HOLD | COMMUNITY

## 2023-03-15 RX ORDER — PANTOPRAZOLE 40 MG/1
TAKE 1 TABLET BY MOUTH ONCE DAILY TABLET, DELAYED RELEASE ORAL
Qty: 90 TABLET | Refills: 0 | Status: ACTIVE | COMMUNITY

## 2023-03-15 RX ORDER — HYDROCODONE BITARTRATE AND ACETAMINOPHEN 5; 325 MG/1; MG/1
1 TABLET AS NEEDED TABLET ORAL
Status: ACTIVE | COMMUNITY

## 2023-03-15 RX ORDER — TENOFOVIR ALAFENAMIDE 25 MG/1
TAKE 1 TABLET BY MOUTH ONCE DAILY WITH FOOD TABLET ORAL
Qty: 30 TABLET | Refills: 0 | Status: ACTIVE | COMMUNITY

## 2023-03-15 RX ORDER — PANTOPRAZOLE SODIUM 40 MG/1
1 TABLET TABLET, DELAYED RELEASE ORAL ONCE A DAY
Qty: 90 | Refills: 1 | Status: ACTIVE | COMMUNITY
Start: 2022-12-23

## 2023-03-15 RX ORDER — METOPROLOL TARTRATE 100 MG/1
1 TABLET WITH FOOD TABLET, FILM COATED ORAL TWICE A DAY
Status: ACTIVE | COMMUNITY

## 2023-03-15 RX ORDER — FINASTERIDE 5 MG/1
1 TABLET TABLET, FILM COATED ORAL ONCE A DAY
Status: ACTIVE | COMMUNITY

## 2023-03-15 RX ORDER — SILDENAFIL 100 MG/1
1 TABLET AS NEEDED TABLET, FILM COATED ORAL ONCE A DAY
Status: ON HOLD | COMMUNITY

## 2023-03-15 RX ORDER — PREGABALIN 75 MG/1
1 CAPSULE CAPSULE ORAL TWICE A DAY
Status: ON HOLD | COMMUNITY

## 2023-03-15 RX ORDER — CHOLESTYRAMINE 4 G/9G
1 PACKET MIXED WITH WATER OR NON-CARBONATED DRINK POWDER, FOR SUSPENSION ORAL ONCE A DAY
Qty: 30 | Status: ON HOLD | COMMUNITY
Start: 2022-11-21

## 2023-03-15 RX ORDER — GLIPIZIDE 5 MG/1
1 TABLET 30 MINUTES BEFORE BREAKFAST TABLET ORAL ONCE A DAY
Status: ON HOLD | COMMUNITY

## 2023-03-15 RX ORDER — RIVAROXABAN 20 MG/1
1 TABLET WITH FOOD TABLET, FILM COATED ORAL ONCE A DAY
Status: ACTIVE | COMMUNITY

## 2023-03-15 RX ORDER — OLMESARTAN MEDOXOMIL 40 MG/1
1 TABLET TABLET, FILM COATED ORAL ONCE A DAY
Status: ACTIVE | COMMUNITY

## 2023-03-15 RX ORDER — LOPERAMIDE HYDROCHLORIDE 2 MG/1
1 CAPSULE AS NEEDED CAPSULE ORAL
Status: ON HOLD | COMMUNITY

## 2023-03-15 RX ORDER — GLIMEPIRIDE 4 MG/1
1 TABLET WITH BREAKFAST OR THE FIRST MAIN MEAL OF THE DAY TABLET ORAL ONCE A DAY
Status: ON HOLD | COMMUNITY

## 2023-03-15 RX ORDER — HUMAN INSULIN 100 [IU]/ML
30 UNITS INJECTION, SUSPENSION SUBCUTANEOUS BID
Status: ACTIVE | COMMUNITY
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
## 2023-03-17 LAB
A/G RATIO: 1.7
ABSOLUTE BASOPHILS: 11
ABSOLUTE EOSINOPHILS: 0
ABSOLUTE LYMPHOCYTES: 813
ABSOLUTE MONOCYTES: 803
ABSOLUTE NEUTROPHILS: 9074
ALBUMIN: 4.8
ALKALINE PHOSPHATASE: 86
ALT (SGPT): 16
AST (SGOT): 12
BASOPHILS: 0.1
BILIRUBIN, TOTAL: 0.5
BUN/CREATININE RATIO: (no result)
BUN: 22
CALCIUM: 10.2
CARBON DIOXIDE, TOTAL: 22
CHLORIDE: 101
CREATININE: 1.06
EGFR: 78
EOSINOPHILS: 0
GLOBULIN, TOTAL: 2.8
GLUCOSE: 276
HEMATOCRIT: 38.2
HEMOGLOBIN: 12.4
HEPATITIS B SURFACE ANTIGEN: (no result)
HEPATITIS B VIRUS DNA: <1
HEPATITIS B VIRUS DNA: <10
INR: 1.3
LYMPHOCYTES: 7.6
MCH: 30.2
MCHC: 32.5
MCV: 92.9
MONOCYTES: 7.5
MPV: 12.2
NEUTROPHILS: 84.8
PLATELET COUNT: 244
POTASSIUM: 4.1
PROTEIN, TOTAL: 7.6
PT: 13.5
RDW: 13.6
RED BLOOD CELL COUNT: 4.11
SODIUM: 134
WHITE BLOOD CELL COUNT: 10.7

## 2023-03-20 ENCOUNTER — TELEPHONE ENCOUNTER (OUTPATIENT)
Dept: URBAN - METROPOLITAN AREA CLINIC 86 | Facility: CLINIC | Age: 63
End: 2023-03-20

## 2023-03-20 NOTE — HPI-TODAY'S VISIT:
Dear Selvin Devries,  The 3/15/23 labs were sent to me.  The glucose 276, creatine 1.06, sodium 134, potassium 4.1, bilirubin 0.5, alkaline phosphatase 86, ast 12, alt 16.  The hepatitis B virus is not detected.   The red blood cell count 4.11, hemoglobin 12.4, mcv 92.9, platelets 244. The neutrophils are up at 9074, please share with the primary care. The lymphocytes are low at 813. Inr 1.3. Hepatitis B surface antigen is not reactive. The hemoglobin and red blood cells slightly low.   Good to see no virus. Be sure to complete the ultrasound to check on the liver. Also make sure to follow up with GI about your recent hospital stay.  Kat Garg PA-C

## 2023-03-30 ENCOUNTER — OFFICE VISIT (OUTPATIENT)
Dept: URBAN - METROPOLITAN AREA CLINIC 91 | Facility: CLINIC | Age: 63
End: 2023-03-30
Payer: COMMERCIAL

## 2023-03-30 DIAGNOSIS — B19.10 HEPATITIS B: ICD-10-CM

## 2023-03-30 PROCEDURE — 76705 ECHO EXAM OF ABDOMEN: CPT

## 2023-03-30 PROCEDURE — 93975 VASCULAR STUDY: CPT

## 2023-04-05 ENCOUNTER — LAB OUTSIDE AN ENCOUNTER (OUTPATIENT)
Dept: URBAN - METROPOLITAN AREA CLINIC 86 | Facility: CLINIC | Age: 63
End: 2023-04-05

## 2023-04-05 ENCOUNTER — TELEPHONE ENCOUNTER (OUTPATIENT)
Dept: URBAN - METROPOLITAN AREA CLINIC 86 | Facility: CLINIC | Age: 63
End: 2023-04-05

## 2023-04-05 PROBLEM — 15633921000119109: Status: ACTIVE | Noted: 2023-04-05

## 2023-04-05 PROBLEM — 89362005: Status: ACTIVE | Noted: 2023-04-05

## 2023-04-05 NOTE — HPI-TODAY'S VISIT:
Dear Selvin Devries,   The liver echogenicity appears normal and no lesions. The common bile duct is 9 mm and this is upper limits of normal. The Right kidney is normal. No ascites. The hepatic vasculature is patent. The spleen 10.1 cm. If you recall you had a ct scan in august showing a possible 6 mm cyst. The ultrasound is not seeing this. Given the common bile duct size and the cyst that was not seen I would recommend we get an MRI to be sure no issues.  I will put in the order for [place]. Call [phone]. Would need to make sure the AICD is still compatible with mri.  Let us know if there are issues.  Kat Garg PA-C

## 2023-04-12 ENCOUNTER — OFFICE VISIT (OUTPATIENT)
Dept: URBAN - METROPOLITAN AREA CLINIC 92 | Facility: CLINIC | Age: 63
End: 2023-04-12
Payer: COMMERCIAL

## 2023-04-12 VITALS
DIASTOLIC BLOOD PRESSURE: 90 MMHG | SYSTOLIC BLOOD PRESSURE: 137 MMHG | HEIGHT: 68 IN | BODY MASS INDEX: 26.67 KG/M2 | WEIGHT: 176 LBS | HEART RATE: 69 BPM | TEMPERATURE: 96.8 F

## 2023-04-12 DIAGNOSIS — K92.2 ACUTE GI BLEEDING: ICD-10-CM

## 2023-04-12 DIAGNOSIS — R13.19 ESOPHAGEAL DYSPHAGIA: ICD-10-CM

## 2023-04-12 DIAGNOSIS — K21.9 GASTROESOPHAGEAL REFLUX DISEASE WITHOUT ESOPHAGITIS: ICD-10-CM

## 2023-04-12 DIAGNOSIS — K64.8 INTERNAL HEMORRHOID: ICD-10-CM

## 2023-04-12 PROCEDURE — 99214 OFFICE O/P EST MOD 30 MIN: CPT

## 2023-04-12 RX ORDER — PANTOPRAZOLE SODIUM 40 MG/1
1 TABLET TABLET, DELAYED RELEASE ORAL ONCE A DAY
Qty: 90 | Refills: 1 | Status: ACTIVE | COMMUNITY
Start: 2022-12-23

## 2023-04-12 RX ORDER — FLUTICASONE FUROATE, UMECLIDINIUM BROMIDE AND VILANTEROL TRIFENATATE 100; 62.5; 25 UG/1; UG/1; UG/1
1 PUFF POWDER RESPIRATORY (INHALATION) ONCE A DAY
Status: ACTIVE | COMMUNITY

## 2023-04-12 RX ORDER — GLIPIZIDE 5 MG/1
1 TABLET 30 MINUTES BEFORE BREAKFAST TABLET ORAL ONCE A DAY
Status: ACTIVE | COMMUNITY

## 2023-04-12 RX ORDER — PANTOPRAZOLE SODIUM 40 MG/1
1 TABLET TABLET, DELAYED RELEASE ORAL TWICE A DAY
Qty: 180 TABLET | Refills: 1 | OUTPATIENT
Start: 2023-04-12

## 2023-04-12 RX ORDER — ONDANSETRON HYDROCHLORIDE 4 MG/1
1 TABLET TABLET, FILM COATED ORAL ONCE A DAY
Status: ACTIVE | COMMUNITY

## 2023-04-12 RX ORDER — LOPERAMIDE HYDROCHLORIDE 2 MG/1
1 CAPSULE AS NEEDED CAPSULE ORAL
Status: ACTIVE | COMMUNITY

## 2023-04-12 RX ORDER — TAMSULOSIN HYDROCHLORIDE 0.4 MG/1
1 CAPSULE CAPSULE ORAL ONCE A DAY
Status: ACTIVE | COMMUNITY

## 2023-04-12 RX ORDER — PANTOPRAZOLE 40 MG/1
TAKE 1 TABLET BY MOUTH ONCE DAILY TABLET, DELAYED RELEASE ORAL
Qty: 90 TABLET | Refills: 0 | Status: ACTIVE | COMMUNITY

## 2023-04-12 RX ORDER — OLMESARTAN MEDOXOMIL 40 MG/1
1 TABLET TABLET, FILM COATED ORAL ONCE A DAY
Status: ACTIVE | COMMUNITY

## 2023-04-12 RX ORDER — METOPROLOL TARTRATE 100 MG/1
1 TABLET WITH FOOD TABLET, FILM COATED ORAL TWICE A DAY
Status: ACTIVE | COMMUNITY

## 2023-04-12 RX ORDER — RIVAROXABAN 20 MG/1
1 TABLET WITH FOOD TABLET, FILM COATED ORAL ONCE A DAY
Status: ACTIVE | COMMUNITY

## 2023-04-12 RX ORDER — CHOLESTYRAMINE 4 G/9G
1 PACKET MIXED WITH WATER OR NON-CARBONATED DRINK POWDER, FOR SUSPENSION ORAL ONCE A DAY
Qty: 30 | Status: ACTIVE | COMMUNITY
Start: 2022-11-21

## 2023-04-12 RX ORDER — GLIMEPIRIDE 4 MG/1
1 TABLET WITH BREAKFAST OR THE FIRST MAIN MEAL OF THE DAY TABLET ORAL ONCE A DAY
Status: ACTIVE | COMMUNITY

## 2023-04-12 RX ORDER — PREGABALIN 75 MG/1
1 CAPSULE CAPSULE ORAL TWICE A DAY
Status: ACTIVE | COMMUNITY

## 2023-04-12 RX ORDER — LISINOPRIL 40 MG/1
1 TABLET TABLET ORAL ONCE A DAY
Status: ACTIVE | COMMUNITY

## 2023-04-12 RX ORDER — HYDROCODONE BITARTRATE AND ACETAMINOPHEN 5; 325 MG/1; MG/1
1 TABLET AS NEEDED TABLET ORAL
Status: ACTIVE | COMMUNITY

## 2023-04-12 RX ORDER — FINASTERIDE 5 MG/1
1 TABLET TABLET, FILM COATED ORAL ONCE A DAY
Status: ACTIVE | COMMUNITY

## 2023-04-12 RX ORDER — SILDENAFIL 100 MG/1
1 TABLET AS NEEDED TABLET, FILM COATED ORAL ONCE A DAY
Status: ACTIVE | COMMUNITY

## 2023-04-12 RX ORDER — HUMAN INSULIN 100 [IU]/ML
30 UNITS INJECTION, SUSPENSION SUBCUTANEOUS BID
Status: ACTIVE | COMMUNITY

## 2023-04-12 RX ORDER — TENOFOVIR ALAFENAMIDE 25 MG/1
TAKE 1 TABLET BY MOUTH ONCE DAILY WITH FOOD TABLET ORAL
Qty: 90 | Refills: 0 | Status: ACTIVE | COMMUNITY

## 2023-04-12 NOTE — HPI-TODAY'S VISIT:
62-year-old male presents today for f/u.   11/21/22 He was seen at [facility] on 11- a CT scan was obtained w/o contrast which showed diffuse urinary bladder wall thickening.  Correlate with urinalysis for acute cystitis, fluid-filled ascending colon reflecting known diarrhea status.  CBC, lipase, CMP all normal, Urine showed some blood. His BP was also noted to be elevated. Currently states his symptoms started about 10 days ago where he had increased stomach irritation and started having loose watery diarrhea.  He has been having bowel movements 6-7 times a day and notes his stool is dark but denies any bright red blood.  He notes increased urgency and has nighttime symptoms multiple times of the night.  He has tried loperamide with minimal reliefAbdominal cramping is all over and is constant.  Having a bowel movement makes it feel better for a few seconds but pain then returns.  He denies any new medications or antibiotics since onset.  No recent hospitalizations or travel outside of country.  He does note he was discharged with amoxicillin due to a sinus infection which she stopped taking.  He notes he stopped taking all of his medications for the last several days because he does not know if his medications are causing his symptoms. He also notes some nausea which she is taking Zofran for which helps.  Denies vomiting, dysphagia, odynophagia, GERD.  Denies chronic NSAID use.  Patient is seen liver center due to positive hep B core antibody positive he is currently on Vemlidy due to needing to be on rituximab for RA and scleroderma treatment. he started this overa month ago.  EGD 6-:Normal negative H. pylori GES 2020: normal Colonoscopy 6/:Internal hemorrhoids, 2 mm tubular adenoma in ascending repeat in 5 years Last CT scan with and without contrast with us 8- no evidence of acute intra-abdominal process, normal pancreas  he was given questran and gpp was obtained which was negative.   12/23/22 Today patient states his BM are much better. He is having 3 BM daily sometimes normal sometimes looser which has been his normal for a while. He does note some oily stools. He denies any rectal bleeding, melena, nighttime sx, weight loss. Questran did help the diarrhea, but he is not needing this anymore. He has been on Creon in the past for presumed chronic pancreatitis.  He does still have a burning sensation in his epigastric area. He is on pantoprazole 40mg intermittently. Has some intermitent nausea getting better since last visit. He also notes increased burning when he takes his medications on empty stomach so he has been taking them with meals now. He denies any regurg, vomiting, dysphagia or odynophagia. He uses NSAIDs occasionally.  He states his appetite has also been better lately.  4/12/23  In February patient presented to [facility] after 3-day history of melena with no abdominal pain nausea vomiting.  He was found to be hypotensive and with significant anemia and evidence of active jejunal bleed on CTA.  He was taken for a urgent angiography but no active bleeding was found.  PillCam was obtained which was negative and a upper endoscopy was also obtained which was negative.  Patient was stabilized and then discharged.  He states that he has no longer noticed any blood in his stool or melena. His main complaint is increased acid reflux and globus sensation.  He states he has been taking pantoprazole 40 mg 30 minutes before breakfast but still has symptoms, so he supplements with over-the-counter Pepcid twice a day.  He also notes intermittent episodes of dysphagia to solids only most recent episode was yesterday after he was eating a sandwich.  He has no dysphagia to liquids or odynophagia.  He has some nausea but denies vomiting.  No NSAID use. He remains having alternating bowel movements but denies any increased constipation or diarrhea. He does note some irritation with his IH and states these have been bothering him more, and he has intermittent blood when he wipes. he has tried OTC preperation H with minimal relief.

## 2023-05-01 ENCOUNTER — OFFICE VISIT (OUTPATIENT)
Dept: URBAN - METROPOLITAN AREA CLINIC 92 | Facility: CLINIC | Age: 63
End: 2023-05-01

## 2023-05-01 RX ORDER — SILDENAFIL 100 MG/1
1 TABLET AS NEEDED TABLET, FILM COATED ORAL ONCE A DAY
Status: ACTIVE | COMMUNITY

## 2023-05-01 RX ORDER — TAMSULOSIN HYDROCHLORIDE 0.4 MG/1
1 CAPSULE CAPSULE ORAL ONCE A DAY
Status: ACTIVE | COMMUNITY

## 2023-05-01 RX ORDER — ONDANSETRON HYDROCHLORIDE 4 MG/1
1 TABLET TABLET, FILM COATED ORAL ONCE A DAY
Status: ACTIVE | COMMUNITY

## 2023-05-01 RX ORDER — RIVAROXABAN 20 MG/1
1 TABLET WITH FOOD TABLET, FILM COATED ORAL ONCE A DAY
Status: ACTIVE | COMMUNITY

## 2023-05-01 RX ORDER — FINASTERIDE 5 MG/1
1 TABLET TABLET, FILM COATED ORAL ONCE A DAY
Status: ACTIVE | COMMUNITY

## 2023-05-01 RX ORDER — PREGABALIN 75 MG/1
1 CAPSULE CAPSULE ORAL TWICE A DAY
Status: ACTIVE | COMMUNITY

## 2023-05-01 RX ORDER — PANTOPRAZOLE 40 MG/1
TAKE 1 TABLET BY MOUTH ONCE DAILY TABLET, DELAYED RELEASE ORAL
Qty: 90 TABLET | Refills: 0 | Status: ACTIVE | COMMUNITY

## 2023-05-01 RX ORDER — PANTOPRAZOLE SODIUM 40 MG/1
1 TABLET TABLET, DELAYED RELEASE ORAL ONCE A DAY
Qty: 90 | Refills: 1 | Status: ACTIVE | COMMUNITY
Start: 2022-12-23

## 2023-05-01 RX ORDER — HYDROCODONE BITARTRATE AND ACETAMINOPHEN 5; 325 MG/1; MG/1
1 TABLET AS NEEDED TABLET ORAL
Status: ACTIVE | COMMUNITY

## 2023-05-01 RX ORDER — FLUTICASONE FUROATE, UMECLIDINIUM BROMIDE AND VILANTEROL TRIFENATATE 100; 62.5; 25 UG/1; UG/1; UG/1
1 PUFF POWDER RESPIRATORY (INHALATION) ONCE A DAY
Status: ACTIVE | COMMUNITY

## 2023-05-01 RX ORDER — LOPERAMIDE HYDROCHLORIDE 2 MG/1
1 CAPSULE AS NEEDED CAPSULE ORAL
Status: ACTIVE | COMMUNITY

## 2023-05-01 RX ORDER — HUMAN INSULIN 100 [IU]/ML
30 UNITS INJECTION, SUSPENSION SUBCUTANEOUS BID
Status: ACTIVE | COMMUNITY

## 2023-05-01 RX ORDER — LISINOPRIL 40 MG/1
1 TABLET TABLET ORAL ONCE A DAY
Status: ACTIVE | COMMUNITY

## 2023-05-01 RX ORDER — OLMESARTAN MEDOXOMIL 40 MG/1
1 TABLET TABLET, FILM COATED ORAL ONCE A DAY
Status: ACTIVE | COMMUNITY

## 2023-05-01 RX ORDER — GLIPIZIDE 5 MG/1
1 TABLET 30 MINUTES BEFORE BREAKFAST TABLET ORAL ONCE A DAY
Status: ACTIVE | COMMUNITY

## 2023-05-01 RX ORDER — TENOFOVIR ALAFENAMIDE 25 MG/1
TAKE 1 TABLET BY MOUTH ONCE DAILY WITH FOOD TABLET ORAL
Qty: 90 | Refills: 0 | Status: ACTIVE | COMMUNITY

## 2023-05-01 RX ORDER — PANTOPRAZOLE SODIUM 40 MG/1
1 TABLET TABLET, DELAYED RELEASE ORAL TWICE A DAY
Qty: 180 TABLET | Refills: 1 | Status: ACTIVE | COMMUNITY
Start: 2023-04-12

## 2023-05-01 RX ORDER — METOPROLOL TARTRATE 100 MG/1
1 TABLET WITH FOOD TABLET, FILM COATED ORAL TWICE A DAY
Status: ACTIVE | COMMUNITY

## 2023-05-01 RX ORDER — CHOLESTYRAMINE 4 G/9G
1 PACKET MIXED WITH WATER OR NON-CARBONATED DRINK POWDER, FOR SUSPENSION ORAL ONCE A DAY
Qty: 30 | Status: ACTIVE | COMMUNITY
Start: 2022-11-21

## 2023-05-01 RX ORDER — GLIMEPIRIDE 4 MG/1
1 TABLET WITH BREAKFAST OR THE FIRST MAIN MEAL OF THE DAY TABLET ORAL ONCE A DAY
Status: ACTIVE | COMMUNITY

## 2023-05-05 ENCOUNTER — OFFICE VISIT (OUTPATIENT)
Dept: URBAN - METROPOLITAN AREA CLINIC 92 | Facility: CLINIC | Age: 63
End: 2023-05-05

## 2023-06-15 ENCOUNTER — LAB OUTSIDE AN ENCOUNTER (OUTPATIENT)
Dept: URBAN - METROPOLITAN AREA CLINIC 86 | Facility: CLINIC | Age: 63
End: 2023-06-15

## 2023-06-15 ENCOUNTER — OFFICE VISIT (OUTPATIENT)
Dept: URBAN - METROPOLITAN AREA CLINIC 86 | Facility: CLINIC | Age: 63
End: 2023-06-15

## 2023-06-15 RX ORDER — RIVAROXABAN 20 MG/1
1 TABLET WITH FOOD TABLET, FILM COATED ORAL ONCE A DAY
Status: ACTIVE | COMMUNITY

## 2023-06-15 RX ORDER — PANTOPRAZOLE 40 MG/1
TAKE 1 TABLET BY MOUTH ONCE DAILY TABLET, DELAYED RELEASE ORAL
Qty: 90 TABLET | Refills: 0 | Status: ACTIVE | COMMUNITY

## 2023-06-15 RX ORDER — LOPERAMIDE HYDROCHLORIDE 2 MG/1
1 CAPSULE AS NEEDED CAPSULE ORAL
Status: ACTIVE | COMMUNITY

## 2023-06-15 RX ORDER — PREGABALIN 75 MG/1
1 CAPSULE CAPSULE ORAL TWICE A DAY
Status: ACTIVE | COMMUNITY

## 2023-06-15 RX ORDER — LISINOPRIL 40 MG/1
1 TABLET TABLET ORAL ONCE A DAY
Status: ACTIVE | COMMUNITY

## 2023-06-15 RX ORDER — FINASTERIDE 5 MG/1
1 TABLET TABLET, FILM COATED ORAL ONCE A DAY
Status: ACTIVE | COMMUNITY

## 2023-06-15 RX ORDER — SILDENAFIL 100 MG/1
1 TABLET AS NEEDED TABLET, FILM COATED ORAL ONCE A DAY
Status: ACTIVE | COMMUNITY

## 2023-06-15 RX ORDER — GLIPIZIDE 5 MG/1
1 TABLET 30 MINUTES BEFORE BREAKFAST TABLET ORAL ONCE A DAY
Status: ACTIVE | COMMUNITY

## 2023-06-15 RX ORDER — TENOFOVIR ALAFENAMIDE 25 MG/1
TAKE 1 TABLET BY MOUTH ONCE DAILY WITH FOOD TABLET ORAL
Qty: 90 | Refills: 0

## 2023-06-15 RX ORDER — HYDROCODONE BITARTRATE AND ACETAMINOPHEN 5; 325 MG/1; MG/1
1 TABLET AS NEEDED TABLET ORAL
Status: ACTIVE | COMMUNITY

## 2023-06-15 RX ORDER — FLUTICASONE FUROATE, UMECLIDINIUM BROMIDE AND VILANTEROL TRIFENATATE 100; 62.5; 25 UG/1; UG/1; UG/1
1 PUFF POWDER RESPIRATORY (INHALATION) ONCE A DAY
Status: ACTIVE | COMMUNITY

## 2023-06-15 RX ORDER — CHOLESTYRAMINE 4 G/9G
1 PACKET MIXED WITH WATER OR NON-CARBONATED DRINK POWDER, FOR SUSPENSION ORAL ONCE A DAY
Qty: 30 | Status: ACTIVE | COMMUNITY
Start: 2022-11-21

## 2023-06-15 RX ORDER — PANTOPRAZOLE SODIUM 40 MG/1
1 TABLET TABLET, DELAYED RELEASE ORAL TWICE A DAY
Qty: 180 TABLET | Refills: 1 | Status: ACTIVE | COMMUNITY
Start: 2023-04-12

## 2023-06-15 RX ORDER — PANTOPRAZOLE SODIUM 40 MG/1
1 TABLET TABLET, DELAYED RELEASE ORAL ONCE A DAY
Qty: 90 | Refills: 1 | Status: ACTIVE | COMMUNITY
Start: 2022-12-23

## 2023-06-15 RX ORDER — HUMAN INSULIN 100 [IU]/ML
30 UNITS INJECTION, SUSPENSION SUBCUTANEOUS BID
Status: ACTIVE | COMMUNITY

## 2023-06-15 RX ORDER — GLIMEPIRIDE 4 MG/1
1 TABLET WITH BREAKFAST OR THE FIRST MAIN MEAL OF THE DAY TABLET ORAL ONCE A DAY
Status: ACTIVE | COMMUNITY

## 2023-06-15 RX ORDER — TENOFOVIR ALAFENAMIDE 25 MG/1
TAKE 1 TABLET BY MOUTH ONCE DAILY WITH FOOD TABLET ORAL
Qty: 90 | Refills: 0 | Status: ACTIVE | COMMUNITY

## 2023-06-15 RX ORDER — ONDANSETRON HYDROCHLORIDE 4 MG/1
1 TABLET TABLET, FILM COATED ORAL ONCE A DAY
Status: ACTIVE | COMMUNITY

## 2023-06-15 RX ORDER — TAMSULOSIN HYDROCHLORIDE 0.4 MG/1
1 CAPSULE CAPSULE ORAL ONCE A DAY
Status: ACTIVE | COMMUNITY

## 2023-06-15 RX ORDER — METOPROLOL TARTRATE 100 MG/1
1 TABLET WITH FOOD TABLET, FILM COATED ORAL TWICE A DAY
Status: ACTIVE | COMMUNITY

## 2023-06-15 RX ORDER — OLMESARTAN MEDOXOMIL 40 MG/1
1 TABLET TABLET, FILM COATED ORAL ONCE A DAY
Status: ACTIVE | COMMUNITY
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
## 2023-06-16 ENCOUNTER — OFFICE VISIT (OUTPATIENT)
Dept: URBAN - METROPOLITAN AREA CLINIC 86 | Facility: CLINIC | Age: 63
End: 2023-06-16

## 2023-06-16 RX ORDER — CHOLESTYRAMINE 4 G/9G
1 PACKET MIXED WITH WATER OR NON-CARBONATED DRINK POWDER, FOR SUSPENSION ORAL ONCE A DAY
Qty: 30 | Status: ACTIVE | COMMUNITY
Start: 2022-11-21

## 2023-06-16 RX ORDER — PANTOPRAZOLE 40 MG/1
TAKE 1 TABLET BY MOUTH ONCE DAILY TABLET, DELAYED RELEASE ORAL
Qty: 90 TABLET | Refills: 0 | Status: ACTIVE | COMMUNITY

## 2023-06-16 RX ORDER — RIVAROXABAN 20 MG/1
1 TABLET WITH FOOD TABLET, FILM COATED ORAL ONCE A DAY
Status: ACTIVE | COMMUNITY

## 2023-06-16 RX ORDER — PANTOPRAZOLE SODIUM 40 MG/1
1 TABLET TABLET, DELAYED RELEASE ORAL ONCE A DAY
Qty: 90 | Refills: 1 | Status: ACTIVE | COMMUNITY
Start: 2022-12-23

## 2023-06-16 RX ORDER — TENOFOVIR ALAFENAMIDE 25 MG/1
TAKE 1 TABLET BY MOUTH ONCE DAILY WITH FOOD TABLET ORAL
Qty: 90 | Refills: 0

## 2023-06-16 RX ORDER — HUMAN INSULIN 100 [IU]/ML
30 UNITS INJECTION, SUSPENSION SUBCUTANEOUS BID
Status: ACTIVE | COMMUNITY

## 2023-06-16 RX ORDER — LISINOPRIL 40 MG/1
1 TABLET TABLET ORAL ONCE A DAY
Status: ACTIVE | COMMUNITY

## 2023-06-16 RX ORDER — ONDANSETRON HYDROCHLORIDE 4 MG/1
1 TABLET TABLET, FILM COATED ORAL ONCE A DAY
Status: ACTIVE | COMMUNITY

## 2023-06-16 RX ORDER — TENOFOVIR ALAFENAMIDE 25 MG/1
TAKE 1 TABLET BY MOUTH ONCE DAILY WITH FOOD TABLET ORAL
Qty: 90 | Refills: 0 | Status: ACTIVE | COMMUNITY

## 2023-06-16 RX ORDER — PREGABALIN 75 MG/1
1 CAPSULE CAPSULE ORAL TWICE A DAY
Status: ACTIVE | COMMUNITY

## 2023-06-16 RX ORDER — HYDROCODONE BITARTRATE AND ACETAMINOPHEN 5; 325 MG/1; MG/1
1 TABLET AS NEEDED TABLET ORAL
Status: ACTIVE | COMMUNITY

## 2023-06-16 RX ORDER — GLIMEPIRIDE 4 MG/1
1 TABLET WITH BREAKFAST OR THE FIRST MAIN MEAL OF THE DAY TABLET ORAL ONCE A DAY
Status: ACTIVE | COMMUNITY

## 2023-06-16 RX ORDER — GLIPIZIDE 5 MG/1
1 TABLET 30 MINUTES BEFORE BREAKFAST TABLET ORAL ONCE A DAY
Status: ACTIVE | COMMUNITY

## 2023-06-16 RX ORDER — OLMESARTAN MEDOXOMIL 40 MG/1
1 TABLET TABLET, FILM COATED ORAL ONCE A DAY
Status: ACTIVE | COMMUNITY

## 2023-06-16 RX ORDER — METOPROLOL TARTRATE 100 MG/1
1 TABLET WITH FOOD TABLET, FILM COATED ORAL TWICE A DAY
Status: ACTIVE | COMMUNITY

## 2023-06-16 RX ORDER — TAMSULOSIN HYDROCHLORIDE 0.4 MG/1
1 CAPSULE CAPSULE ORAL ONCE A DAY
Status: ACTIVE | COMMUNITY

## 2023-06-16 RX ORDER — LOPERAMIDE HYDROCHLORIDE 2 MG/1
1 CAPSULE AS NEEDED CAPSULE ORAL
Status: ACTIVE | COMMUNITY

## 2023-06-16 RX ORDER — SILDENAFIL 100 MG/1
1 TABLET AS NEEDED TABLET, FILM COATED ORAL ONCE A DAY
Status: ACTIVE | COMMUNITY

## 2023-06-16 RX ORDER — FINASTERIDE 5 MG/1
1 TABLET TABLET, FILM COATED ORAL ONCE A DAY
Status: ACTIVE | COMMUNITY

## 2023-06-16 RX ORDER — FLUTICASONE FUROATE, UMECLIDINIUM BROMIDE AND VILANTEROL TRIFENATATE 100; 62.5; 25 UG/1; UG/1; UG/1
1 PUFF POWDER RESPIRATORY (INHALATION) ONCE A DAY
Status: ACTIVE | COMMUNITY

## 2023-06-16 RX ORDER — PANTOPRAZOLE SODIUM 40 MG/1
1 TABLET TABLET, DELAYED RELEASE ORAL TWICE A DAY
Qty: 180 TABLET | Refills: 1 | Status: ACTIVE | COMMUNITY
Start: 2023-04-12
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
## 2023-07-12 ENCOUNTER — OFFICE VISIT (OUTPATIENT)
Dept: URBAN - METROPOLITAN AREA CLINIC 92 | Facility: CLINIC | Age: 63
End: 2023-07-12
Payer: COMMERCIAL

## 2023-07-12 VITALS
HEIGHT: 68 IN | HEART RATE: 73 BPM | WEIGHT: 176 LBS | BODY MASS INDEX: 26.67 KG/M2 | TEMPERATURE: 97.4 F | SYSTOLIC BLOOD PRESSURE: 94 MMHG | DIASTOLIC BLOOD PRESSURE: 64 MMHG

## 2023-07-12 DIAGNOSIS — R13.19 ESOPHAGEAL DYSPHAGIA: ICD-10-CM

## 2023-07-12 DIAGNOSIS — K64.8 INTERNAL HEMORRHOID: ICD-10-CM

## 2023-07-12 DIAGNOSIS — K21.9 GASTROESOPHAGEAL REFLUX DISEASE WITHOUT ESOPHAGITIS: ICD-10-CM

## 2023-07-12 PROCEDURE — 99214 OFFICE O/P EST MOD 30 MIN: CPT | Performed by: INTERNAL MEDICINE

## 2023-07-12 RX ORDER — LISINOPRIL 40 MG/1
1 TABLET TABLET ORAL ONCE A DAY
Status: ACTIVE | COMMUNITY

## 2023-07-12 RX ORDER — HYDROCODONE BITARTRATE AND ACETAMINOPHEN 5; 325 MG/1; MG/1
1 TABLET AS NEEDED TABLET ORAL
Status: ACTIVE | COMMUNITY

## 2023-07-12 RX ORDER — TAMSULOSIN HYDROCHLORIDE 0.4 MG/1
1 CAPSULE CAPSULE ORAL ONCE A DAY
Status: ACTIVE | COMMUNITY

## 2023-07-12 RX ORDER — SILDENAFIL 100 MG/1
1 TABLET AS NEEDED TABLET, FILM COATED ORAL ONCE A DAY
Status: ACTIVE | COMMUNITY

## 2023-07-12 RX ORDER — HUMAN INSULIN 100 [IU]/ML
30 UNITS INJECTION, SUSPENSION SUBCUTANEOUS BID
Status: ACTIVE | COMMUNITY

## 2023-07-12 RX ORDER — FLUTICASONE FUROATE, UMECLIDINIUM BROMIDE AND VILANTEROL TRIFENATATE 100; 62.5; 25 UG/1; UG/1; UG/1
1 PUFF POWDER RESPIRATORY (INHALATION) ONCE A DAY
Status: ACTIVE | COMMUNITY

## 2023-07-12 RX ORDER — GLIPIZIDE 5 MG/1
1 TABLET 30 MINUTES BEFORE BREAKFAST TABLET ORAL ONCE A DAY
Status: ACTIVE | COMMUNITY

## 2023-07-12 RX ORDER — RIVAROXABAN 20 MG/1
1 TABLET WITH FOOD TABLET, FILM COATED ORAL ONCE A DAY
Status: ACTIVE | COMMUNITY

## 2023-07-12 RX ORDER — METOPROLOL TARTRATE 100 MG/1
1 TABLET WITH FOOD TABLET, FILM COATED ORAL TWICE A DAY
Status: ACTIVE | COMMUNITY

## 2023-07-12 RX ORDER — GLIMEPIRIDE 4 MG/1
1 TABLET WITH BREAKFAST OR THE FIRST MAIN MEAL OF THE DAY TABLET ORAL ONCE A DAY
Status: ACTIVE | COMMUNITY

## 2023-07-12 RX ORDER — OLMESARTAN MEDOXOMIL 40 MG/1
1 TABLET TABLET, FILM COATED ORAL ONCE A DAY
Status: ACTIVE | COMMUNITY

## 2023-07-12 RX ORDER — HYDROCORTISONE ACETATE 25 MG/1
1 SUPPOSITORY SUPPOSITORY RECTAL ONCE A DAY
Qty: 10 | Refills: 0 | OUTPATIENT
Start: 2023-07-12 | End: 2023-07-22

## 2023-07-12 RX ORDER — LOPERAMIDE HYDROCHLORIDE 2 MG/1
1 CAPSULE AS NEEDED CAPSULE ORAL
Status: ACTIVE | COMMUNITY

## 2023-07-12 RX ORDER — PREGABALIN 75 MG/1
1 CAPSULE CAPSULE ORAL TWICE A DAY
Status: ACTIVE | COMMUNITY

## 2023-07-12 RX ORDER — FINASTERIDE 5 MG/1
1 TABLET TABLET, FILM COATED ORAL ONCE A DAY
Status: ACTIVE | COMMUNITY

## 2023-07-12 RX ORDER — TENOFOVIR ALAFENAMIDE 25 MG/1
TAKE 1 TABLET BY MOUTH ONCE DAILY WITH FOOD TABLET ORAL
Qty: 90 | Refills: 0 | Status: ACTIVE | COMMUNITY

## 2023-07-12 RX ORDER — PANTOPRAZOLE SODIUM 40 MG/1
1 TABLET TABLET, DELAYED RELEASE ORAL ONCE A DAY
Qty: 90 | Refills: 1 | Status: ACTIVE | COMMUNITY
Start: 2022-12-23

## 2023-07-12 NOTE — PHYSICAL EXAM GASTROINTESTINAL
Abdomen,  soft, nontender, nondistended,  no guarding or rigidity,  no masses palpable,  normal bowel sounds Liver and Spleen, no hepatosplenomegaly Rectal Chaperone present Prolapses IH noted non-bleeding and non-thrombosed

## 2023-07-12 NOTE — HPI-TODAY'S VISIT:
62-year-old male patient presents today for follow-up visit.  He is a patient of the liver center and is currently on Vemlidy due to needing to be on rituximab for RA and scleroderma treatment. He has longstanding history of reflux issues.  His most recent upper endoscopy was 2/2023 that demonstrated no abnormalities.  He is taking pantoprazole 40 mg daily and since last visit I recommended he increase this to twice a day.  He was also having issues with dysphagia so I obtained a barium swallow which was negative.  Today he notes that his reflux is much better and his dysphagia has resolved.  He has no odynophagia, nausea, vomiting, NSAID use. He notes alternating bowel movements which he has had for some time.  Recently he has been having some irritation with his internal hemorrhoids.  I did recommend internal hemorrhoid banding at the last visit but patient states that he was afraid to do this.  He notes that recently his internal hemorrhoids are prolapsing and he has been using Preparation H cream for this with minimal relief.  He does not notice any blood in the stool.  Last colonoscopy was 6-3-2021 that demonstrated internal hemorrhoids, 2 mm tubular adenoma recommended to repeat in 5 years. Of note he was hospitalized in February 2023 due to 3-day history of melena with no abdominal pain nausea or vomiting.  He was found to have significant anemia and evidence of active jejunal bleed on CTA.  He was taken for urgent angiography but no active bleeding was found.  PillCam was obtained which was negative and upper endoscopy was also negative.  Patient was stabilized and discharged.  Since then he has had no issues and hemoglobin has been stable.

## 2023-07-18 ENCOUNTER — TELEPHONE ENCOUNTER (OUTPATIENT)
Dept: URBAN - METROPOLITAN AREA CLINIC 92 | Facility: CLINIC | Age: 63
End: 2023-07-18

## 2023-07-27 ENCOUNTER — WEB ENCOUNTER (OUTPATIENT)
Dept: URBAN - METROPOLITAN AREA CLINIC 86 | Facility: CLINIC | Age: 63
End: 2023-07-27

## 2023-07-27 ENCOUNTER — OFFICE VISIT (OUTPATIENT)
Dept: URBAN - METROPOLITAN AREA CLINIC 86 | Facility: CLINIC | Age: 63
End: 2023-07-27
Payer: COMMERCIAL

## 2023-07-27 VITALS
HEIGHT: 68 IN | RESPIRATION RATE: 78 BRPM | WEIGHT: 182 LBS | BODY MASS INDEX: 27.58 KG/M2 | TEMPERATURE: 97.9 F | SYSTOLIC BLOOD PRESSURE: 130 MMHG | DIASTOLIC BLOOD PRESSURE: 80 MMHG

## 2023-07-27 DIAGNOSIS — Z71.89 VACCINE COUNSELING: ICD-10-CM

## 2023-07-27 DIAGNOSIS — M34.9 SCLERODERMA: ICD-10-CM

## 2023-07-27 DIAGNOSIS — Z79.899 HIGH RISK MEDICATION USE: ICD-10-CM

## 2023-07-27 DIAGNOSIS — M06.9 RHEUMATOID ARTHRITIS: ICD-10-CM

## 2023-07-27 DIAGNOSIS — R76.8 HEPATITIS B CORE ANTIBODY POSITIVE: ICD-10-CM

## 2023-07-27 DIAGNOSIS — R10.9 ABDOMINAL PAIN, UNSPECIFIED ABDOMINAL LOCATION: ICD-10-CM

## 2023-07-27 DIAGNOSIS — B18.2 CHRONIC HEPATITIS C: ICD-10-CM

## 2023-07-27 DIAGNOSIS — R89.4 HEPATITIS B CORE ANTIBODY POSITIVE: ICD-10-CM

## 2023-07-27 PROCEDURE — 99214 OFFICE O/P EST MOD 30 MIN: CPT | Performed by: PHYSICIAN ASSISTANT

## 2023-07-27 RX ORDER — TENOFOVIR ALAFENAMIDE 25 MG/1
TAKE 1 TABLET BY MOUTH ONCE DAILY WITH FOOD TABLET ORAL
Qty: 90 | Refills: 0

## 2023-07-27 RX ORDER — RIVAROXABAN 20 MG/1
1 TABLET WITH FOOD TABLET, FILM COATED ORAL ONCE A DAY
Status: ACTIVE | COMMUNITY

## 2023-07-27 RX ORDER — GLIPIZIDE 5 MG/1
1 TABLET 30 MINUTES BEFORE BREAKFAST TABLET ORAL ONCE A DAY
Status: ON HOLD | COMMUNITY

## 2023-07-27 RX ORDER — SILDENAFIL 100 MG/1
1 TABLET AS NEEDED TABLET, FILM COATED ORAL ONCE A DAY
Status: ON HOLD | COMMUNITY

## 2023-07-27 RX ORDER — LISINOPRIL 40 MG/1
1 TABLET TABLET ORAL ONCE A DAY
Status: ACTIVE | COMMUNITY

## 2023-07-27 RX ORDER — HUMAN INSULIN 100 [IU]/ML
30 UNITS INJECTION, SUSPENSION SUBCUTANEOUS BID
Status: ACTIVE | COMMUNITY

## 2023-07-27 RX ORDER — PREGABALIN 75 MG/1
1 CAPSULE CAPSULE ORAL TWICE A DAY
Status: ON HOLD | COMMUNITY

## 2023-07-27 RX ORDER — FLUTICASONE FUROATE, UMECLIDINIUM BROMIDE AND VILANTEROL TRIFENATATE 100; 62.5; 25 UG/1; UG/1; UG/1
1 PUFF POWDER RESPIRATORY (INHALATION) ONCE A DAY
Status: ACTIVE | COMMUNITY

## 2023-07-27 RX ORDER — GLIMEPIRIDE 4 MG/1
1 TABLET WITH BREAKFAST OR THE FIRST MAIN MEAL OF THE DAY TABLET ORAL ONCE A DAY
Status: ACTIVE | COMMUNITY

## 2023-07-27 RX ORDER — OLMESARTAN MEDOXOMIL 40 MG/1
1 TABLET TABLET, FILM COATED ORAL ONCE A DAY
Status: ACTIVE | COMMUNITY

## 2023-07-27 RX ORDER — FINASTERIDE 5 MG/1
1 TABLET TABLET, FILM COATED ORAL ONCE A DAY
Status: ACTIVE | COMMUNITY

## 2023-07-27 RX ORDER — HYDROCODONE BITARTRATE AND ACETAMINOPHEN 5; 325 MG/1; MG/1
1 TABLET AS NEEDED TABLET ORAL
Status: ACTIVE | COMMUNITY

## 2023-07-27 RX ORDER — METOPROLOL TARTRATE 100 MG/1
1 TABLET WITH FOOD TABLET, FILM COATED ORAL TWICE A DAY
Status: ACTIVE | COMMUNITY

## 2023-07-27 RX ORDER — TENOFOVIR ALAFENAMIDE 25 MG/1
TAKE 1 TABLET BY MOUTH ONCE DAILY WITH FOOD TABLET ORAL
Qty: 90 | Refills: 0 | Status: ACTIVE | COMMUNITY

## 2023-07-27 RX ORDER — PANTOPRAZOLE SODIUM 40 MG/1
1 TABLET TABLET, DELAYED RELEASE ORAL ONCE A DAY
Qty: 90 | Refills: 1 | Status: ACTIVE | COMMUNITY
Start: 2022-12-23

## 2023-07-27 RX ORDER — LOPERAMIDE HYDROCHLORIDE 2 MG/1
1 CAPSULE AS NEEDED CAPSULE ORAL
Status: ON HOLD | COMMUNITY

## 2023-07-27 RX ORDER — TAMSULOSIN HYDROCHLORIDE 0.4 MG/1
1 CAPSULE CAPSULE ORAL ONCE A DAY
Status: ACTIVE | COMMUNITY
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
## 2023-08-02 ENCOUNTER — OFFICE VISIT (OUTPATIENT)
Dept: URBAN - METROPOLITAN AREA CLINIC 92 | Facility: CLINIC | Age: 63
End: 2023-08-02

## 2023-08-11 ENCOUNTER — TELEPHONE ENCOUNTER (OUTPATIENT)
Dept: URBAN - METROPOLITAN AREA CLINIC 86 | Facility: CLINIC | Age: 63
End: 2023-08-11

## 2023-08-11 NOTE — HPI-TODAY'S VISIT:
Dear Selvin Devries,  The 8/10/23 MRI was sent to me.  The liver without fat or iron.  They saw a 6 mm area that they thought was consistent with a hemangioma.  These are benign lesions seen on the liver.  Nothing to do for this.  The gallbladder and biliary tree status post cholecystectomy.  No biliary ductal dilatation.  The spleen, pancreas, adrenal glands were normal.  They saw renal cyst which are common and benign and I would just recommend sharing this with your primary care provider so that they are aware of this.  They did not see any abnormalities in the lymph nodes.  The hepatic vasculature is patent.  Overall they did not see any issues or changes consistent with chronic liver disease or lesions that were suspicious.  If you recall we ordered this because the ultrasound you did thought that the common bile duct was enlarged and the MRI did not see this which is good to note.  We will review this again at your next visit.  Kat Garg PA-C

## 2023-08-14 ENCOUNTER — TELEPHONE ENCOUNTER (OUTPATIENT)
Dept: URBAN - METROPOLITAN AREA CLINIC 86 | Facility: CLINIC | Age: 63
End: 2023-08-14

## 2023-08-14 NOTE — HPI-TODAY'S VISIT:
Dear Selvin Devries,  The 8/10/23 MRI was sent to me.  The liver for without fat or iron.  They thought they saw a hemangioma which is a benign liver lesion.  Otherwise unremarkable.  The gallbladder status post cholecystectomy and they did not see any biliary ductal dilatation.  The spleen, pancreas, adrenal glands all normal.  They saw a renal cyst and I would recommend sharing this with the primary care so that they are aware.  The lymph nodes were normal.  The hepatic vascular patent.  Overall they did not see any evidence of chronic liver disease which is good to note.  We will review this at the next visit.  Kat Garg PA-C

## 2023-08-31 ENCOUNTER — OFFICE VISIT (OUTPATIENT)
Dept: URBAN - METROPOLITAN AREA CLINIC 86 | Facility: CLINIC | Age: 63
End: 2023-08-31
Payer: COMMERCIAL

## 2023-08-31 VITALS
SYSTOLIC BLOOD PRESSURE: 125 MMHG | BODY MASS INDEX: 27.13 KG/M2 | TEMPERATURE: 97.1 F | WEIGHT: 179 LBS | HEIGHT: 68 IN | HEART RATE: 76 BPM | DIASTOLIC BLOOD PRESSURE: 74 MMHG

## 2023-08-31 DIAGNOSIS — M34.9 SCLERODERMA: ICD-10-CM

## 2023-08-31 DIAGNOSIS — R89.4 HEPATITIS B CORE ANTIBODY POSITIVE: ICD-10-CM

## 2023-08-31 DIAGNOSIS — R10.9 ABDOMINAL PAIN, UNSPECIFIED ABDOMINAL LOCATION: ICD-10-CM

## 2023-08-31 DIAGNOSIS — Z79.899 HIGH RISK MEDICATION USE: ICD-10-CM

## 2023-08-31 DIAGNOSIS — B18.2 CHRONIC HEPATITIS C: ICD-10-CM

## 2023-08-31 DIAGNOSIS — M06.9 RHEUMATOID ARTHRITIS: ICD-10-CM

## 2023-08-31 DIAGNOSIS — R76.8 HEPATITIS B CORE ANTIBODY POSITIVE: ICD-10-CM

## 2023-08-31 DIAGNOSIS — Z71.89 VACCINE COUNSELING: ICD-10-CM

## 2023-08-31 PROCEDURE — 99214 OFFICE O/P EST MOD 30 MIN: CPT | Performed by: PHYSICIAN ASSISTANT

## 2023-08-31 RX ORDER — FINASTERIDE 5 MG/1
1 TABLET TABLET, FILM COATED ORAL ONCE A DAY
Status: ACTIVE | COMMUNITY

## 2023-08-31 RX ORDER — HUMAN INSULIN 100 [IU]/ML
30 UNITS INJECTION, SUSPENSION SUBCUTANEOUS BID
Status: ACTIVE | COMMUNITY

## 2023-08-31 RX ORDER — OLMESARTAN MEDOXOMIL 40 MG/1
1 TABLET TABLET, FILM COATED ORAL ONCE A DAY
Status: ACTIVE | COMMUNITY

## 2023-08-31 RX ORDER — PREGABALIN 75 MG/1
1 CAPSULE CAPSULE ORAL TWICE A DAY
Status: ON HOLD | COMMUNITY

## 2023-08-31 RX ORDER — SILDENAFIL 100 MG/1
1 TABLET AS NEEDED TABLET, FILM COATED ORAL ONCE A DAY
Status: ON HOLD | COMMUNITY

## 2023-08-31 RX ORDER — LOPERAMIDE HYDROCHLORIDE 2 MG/1
1 CAPSULE AS NEEDED CAPSULE ORAL
Status: ON HOLD | COMMUNITY

## 2023-08-31 RX ORDER — RIVAROXABAN 20 MG/1
1 TABLET WITH FOOD TABLET, FILM COATED ORAL ONCE A DAY
Status: ACTIVE | COMMUNITY

## 2023-08-31 RX ORDER — GLIPIZIDE 5 MG/1
1 TABLET 30 MINUTES BEFORE BREAKFAST TABLET ORAL ONCE A DAY
Status: ON HOLD | COMMUNITY

## 2023-08-31 RX ORDER — GLIMEPIRIDE 4 MG/1
1 TABLET WITH BREAKFAST OR THE FIRST MAIN MEAL OF THE DAY TABLET ORAL ONCE A DAY
Status: ACTIVE | COMMUNITY

## 2023-08-31 RX ORDER — METOPROLOL TARTRATE 100 MG/1
1 TABLET WITH FOOD TABLET, FILM COATED ORAL TWICE A DAY
Status: ACTIVE | COMMUNITY

## 2023-08-31 RX ORDER — TENOFOVIR ALAFENAMIDE 25 MG/1
TAKE 1 TABLET BY MOUTH ONCE DAILY WITH FOOD TABLET ORAL
Qty: 90 | Refills: 0

## 2023-08-31 RX ORDER — TENOFOVIR ALAFENAMIDE 25 MG/1
TAKE 1 TABLET BY MOUTH ONCE DAILY WITH FOOD TABLET ORAL
Qty: 90 | Refills: 0 | Status: ACTIVE | COMMUNITY

## 2023-08-31 RX ORDER — PANTOPRAZOLE SODIUM 40 MG/1
1 TABLET TABLET, DELAYED RELEASE ORAL ONCE A DAY
Qty: 90 | Refills: 1 | Status: ACTIVE | COMMUNITY
Start: 2022-12-23

## 2023-08-31 RX ORDER — FLUTICASONE FUROATE, UMECLIDINIUM BROMIDE AND VILANTEROL TRIFENATATE 100; 62.5; 25 UG/1; UG/1; UG/1
1 PUFF POWDER RESPIRATORY (INHALATION) ONCE A DAY
Status: ACTIVE | COMMUNITY

## 2023-08-31 RX ORDER — HYDROCODONE BITARTRATE AND ACETAMINOPHEN 5; 325 MG/1; MG/1
1 TABLET AS NEEDED TABLET ORAL
Status: ACTIVE | COMMUNITY

## 2023-08-31 RX ORDER — LISINOPRIL 40 MG/1
1 TABLET TABLET ORAL ONCE A DAY
Status: ACTIVE | COMMUNITY

## 2023-08-31 RX ORDER — TAMSULOSIN HYDROCHLORIDE 0.4 MG/1
1 CAPSULE CAPSULE ORAL ONCE A DAY
Status: ACTIVE | COMMUNITY
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
## 2023-09-04 ENCOUNTER — LAB OUTSIDE AN ENCOUNTER (OUTPATIENT)
Dept: URBAN - METROPOLITAN AREA CLINIC 86 | Facility: CLINIC | Age: 63
End: 2023-09-04

## 2023-09-05 LAB
A/G RATIO: 1.8
ABSOLUTE BASOPHILS: 20
ABSOLUTE EOSINOPHILS: 100
ABSOLUTE LYMPHOCYTES: 1105
ABSOLUTE MONOCYTES: 500
ABSOLUTE NEUTROPHILS: 3275
ALBUMIN: 4.3
ALKALINE PHOSPHATASE: 64
ALT (SGPT): 15
AST (SGOT): 17
BASOPHILS: 0.4
BILIRUBIN, TOTAL: 0.4
BUN/CREATININE RATIO: (no result)
BUN: 19
CALCIUM: 9.2
CARBON DIOXIDE, TOTAL: 25
CHLORIDE: 106
CREATININE: 1.21
EGFR: 66
EOSINOPHILS: 2
GLOBULIN, TOTAL: 2.4
GLUCOSE: 75
HBV LOG10: NOT DETECTED
HCV RNA, QUANTITATIVE: <1.18
HCV RNA, QUANTITATIVE: <15
HEMATOCRIT: 44.9
HEMOGLOBIN: 15
HEPATITIS B SURFACE ANTIGEN: (no result)
HEPATITIS B VIRUS DNA: NOT DETECTED
HEPATITIS B VIRUS DNA: NOT DETECTED
LYMPHOCYTES: 22.1
MCH: 31.1
MCHC: 33.4
MCV: 93.2
MONOCYTES: 10
MPV: 12
NEUTROPHILS: 65.5
PLATELET COUNT: 169
POTASSIUM: 4.3
PROTEIN, TOTAL: 6.7
RDW: 13.2
RED BLOOD CELL COUNT: 4.82
SODIUM: 141
WHITE BLOOD CELL COUNT: 5

## 2023-09-07 ENCOUNTER — TELEPHONE ENCOUNTER (OUTPATIENT)
Dept: URBAN - METROPOLITAN AREA CLINIC 86 | Facility: CLINIC | Age: 63
End: 2023-09-07

## 2023-09-07 NOTE — HPI-TODAY'S VISIT:
Selvin Devries,  The 9/1/2023 labs were sent to me.  The hepatitis C virus remains undetected.  The complete blood count showing glucose 75, creatinine 1.21, sodium 141, potassium 4.3, hepatitis B virus remains undetected as well.  White blood cells 5.0, hemoglobin 15, MCV 93, hepatitis B surface antigen is nonreactive which is good to note.  I appreciate you doing the labs and happy to see that the liver enzymes are normal and no viruses detected.   Kat Garg PA-C

## 2023-09-12 ENCOUNTER — OFFICE VISIT (OUTPATIENT)
Dept: URBAN - METROPOLITAN AREA CLINIC 92 | Facility: CLINIC | Age: 63
End: 2023-09-12
Payer: COMMERCIAL

## 2023-09-12 VITALS
HEIGHT: 68 IN | BODY MASS INDEX: 27.58 KG/M2 | DIASTOLIC BLOOD PRESSURE: 77 MMHG | SYSTOLIC BLOOD PRESSURE: 124 MMHG | WEIGHT: 182 LBS | HEART RATE: 62 BPM | TEMPERATURE: 97.2 F

## 2023-09-12 DIAGNOSIS — K64.9 ACUTE HEMORRHOID: ICD-10-CM

## 2023-09-12 DIAGNOSIS — R14.0 ABDOMINAL BLOATING: ICD-10-CM

## 2023-09-12 DIAGNOSIS — R13.19 ESOPHAGEAL DYSPHAGIA: ICD-10-CM

## 2023-09-12 DIAGNOSIS — K21.9 GASTROESOPHAGEAL REFLUX DISEASE WITHOUT ESOPHAGITIS: ICD-10-CM

## 2023-09-12 PROCEDURE — 99214 OFFICE O/P EST MOD 30 MIN: CPT

## 2023-09-12 RX ORDER — TAMSULOSIN HYDROCHLORIDE 0.4 MG/1
1 CAPSULE CAPSULE ORAL ONCE A DAY
Status: ACTIVE | COMMUNITY

## 2023-09-12 RX ORDER — LISINOPRIL 40 MG/1
1 TABLET TABLET ORAL ONCE A DAY
Status: ACTIVE | COMMUNITY

## 2023-09-12 RX ORDER — SILDENAFIL 100 MG/1
1 TABLET AS NEEDED TABLET, FILM COATED ORAL ONCE A DAY
Status: ON HOLD | COMMUNITY

## 2023-09-12 RX ORDER — OLMESARTAN MEDOXOMIL 40 MG/1
1 TABLET TABLET, FILM COATED ORAL ONCE A DAY
Status: ACTIVE | COMMUNITY

## 2023-09-12 RX ORDER — PREGABALIN 75 MG/1
1 CAPSULE CAPSULE ORAL TWICE A DAY
Status: ON HOLD | COMMUNITY

## 2023-09-12 RX ORDER — GLIMEPIRIDE 4 MG/1
1 TABLET WITH BREAKFAST OR THE FIRST MAIN MEAL OF THE DAY TABLET ORAL ONCE A DAY
Status: ACTIVE | COMMUNITY

## 2023-09-12 RX ORDER — LOPERAMIDE HYDROCHLORIDE 2 MG/1
1 CAPSULE AS NEEDED CAPSULE ORAL
Status: ON HOLD | COMMUNITY

## 2023-09-12 RX ORDER — RIVAROXABAN 20 MG/1
1 TABLET WITH FOOD TABLET, FILM COATED ORAL ONCE A DAY
Status: ACTIVE | COMMUNITY

## 2023-09-12 RX ORDER — METOPROLOL TARTRATE 100 MG/1
1 TABLET WITH FOOD TABLET, FILM COATED ORAL TWICE A DAY
Status: ACTIVE | COMMUNITY

## 2023-09-12 RX ORDER — TENOFOVIR ALAFENAMIDE 25 MG/1
TAKE 1 TABLET BY MOUTH ONCE DAILY WITH FOOD TABLET ORAL
Qty: 90 | Refills: 0 | Status: ACTIVE | COMMUNITY

## 2023-09-12 RX ORDER — HYDROCODONE BITARTRATE AND ACETAMINOPHEN 5; 325 MG/1; MG/1
1 TABLET AS NEEDED TABLET ORAL
Status: ACTIVE | COMMUNITY

## 2023-09-12 RX ORDER — HUMAN INSULIN 100 [IU]/ML
30 UNITS INJECTION, SUSPENSION SUBCUTANEOUS BID
Status: ACTIVE | COMMUNITY

## 2023-09-12 RX ORDER — FLUTICASONE FUROATE, UMECLIDINIUM BROMIDE AND VILANTEROL TRIFENATATE 100; 62.5; 25 UG/1; UG/1; UG/1
1 PUFF POWDER RESPIRATORY (INHALATION) ONCE A DAY
Status: ACTIVE | COMMUNITY

## 2023-09-12 RX ORDER — FINASTERIDE 5 MG/1
1 TABLET TABLET, FILM COATED ORAL ONCE A DAY
Status: ACTIVE | COMMUNITY

## 2023-09-12 RX ORDER — GLIPIZIDE 5 MG/1
1 TABLET 30 MINUTES BEFORE BREAKFAST TABLET ORAL ONCE A DAY
Status: ON HOLD | COMMUNITY

## 2023-09-12 RX ORDER — PANTOPRAZOLE SODIUM 40 MG/1
1 TABLET TABLET, DELAYED RELEASE ORAL ONCE A DAY
Qty: 90 | Refills: 1 | Status: ACTIVE | COMMUNITY
Start: 2022-12-23

## 2023-09-12 NOTE — PHYSICAL EXAM GASTROINTESTINAL
Abdomen,  soft, nontender, nondistended,  no guarding or rigidity,  no masses palpable,  normal bowel sounds Liver and Spleen, no hepatosplenomegaly

## 2023-09-12 NOTE — HPI-TODAY'S VISIT:
63-year-old male patient presents today for follow-up visit.  He is a patient of the liver center and is currently on Vemlidy due to needing to be on rituximab for RA and scleroderma treatment.  He has longstanding history of reflux issues.  His most recent upper endoscopy was 2/2023 that demonstrated no abnormalities.  He is taking pantoprazole 40 mg daily and since last visit I recommended he increased this to twice a day.  He was also having issues with dysphagia, so I obtained a barium swallow which was negative.  Today he notes that his reflux is much better and his dysphagia has resolved.  He has no odynophagia, nausea, vomiting, NSAID use. He now complaints of increase bloating, gas in the abdomen. Has gurgling as well. No postprandial fullness.   He notes alternating bowel movements which he has had for some time. He states he thinks this is due to diet. He has a very poor diet constisting of mainly fast food. He says he has lost any motivation to eat healthy. He saw a dietitian in the past which didnt help. Recently he has been having some irritation with his internal hemorrhoids.  I did recommend internal hemorrhoid banding at the last visit but patient states that he was afraid to do this.  He notes he has been using OTC suppositories which is working now.  He does not notice any blood in the stool.  Last colonoscopy was 6-3-2021 that demonstrated internal hemorrhoids, 2 mm tubular adenoma recommended repeating in 5 years.  Of note he was hospitalized in February 2023 due to 3-day history of melena with no abdominal pain nausea or vomiting.  He was found to have significant anemia and evidence of active jejunal bleed on CTA.  He was taken for urgent angiography but no active bleeding was found.  PillCam was obtained which was negative and upper endoscopy was also negative.  Patient was stabilized and discharged.  Since then he has had no issues and hemoglobin has been stable.

## 2023-10-10 ENCOUNTER — TELEPHONE ENCOUNTER (OUTPATIENT)
Dept: URBAN - METROPOLITAN AREA CLINIC 98 | Facility: CLINIC | Age: 63
End: 2023-10-10

## 2023-10-16 ENCOUNTER — LAB OUTSIDE AN ENCOUNTER (OUTPATIENT)
Dept: URBAN - METROPOLITAN AREA CLINIC 86 | Facility: CLINIC | Age: 63
End: 2023-10-16

## 2023-10-17 ENCOUNTER — ERX REFILL RESPONSE (OUTPATIENT)
Dept: URBAN - METROPOLITAN AREA CLINIC 92 | Facility: CLINIC | Age: 63
End: 2023-10-17

## 2023-10-17 RX ORDER — PANTOPRAZOLE SODIUM 40 MG/1
TAKE 1 TABLET BY MOUTH ONCE DAILY TABLET, DELAYED RELEASE ORAL
Qty: 30 TABLET | Refills: 10 | OUTPATIENT

## 2023-10-17 RX ORDER — PANTOPRAZOLE SODIUM 40 MG/1
1 TABLET TABLET, DELAYED RELEASE ORAL ONCE A DAY
Qty: 90 | Refills: 1 | OUTPATIENT

## 2023-11-21 ENCOUNTER — TELEPHONE ENCOUNTER (OUTPATIENT)
Dept: URBAN - METROPOLITAN AREA CLINIC 86 | Facility: CLINIC | Age: 63
End: 2023-11-21

## 2023-11-21 RX ORDER — TENOFOVIR ALAFENAMIDE 25 MG/1
TAKE 1 TABLET BY MOUTH ONCE DAILY WITH FOOD TABLET ORAL
Qty: 90 | Refills: 0
End: 2024-02-19

## 2023-11-29 ENCOUNTER — CLAIMS CREATED FROM THE CLAIM WINDOW (OUTPATIENT)
Dept: URBAN - METROPOLITAN AREA CLINIC 86 | Facility: CLINIC | Age: 63
End: 2023-11-29
Payer: COMMERCIAL

## 2023-11-29 VITALS
HEART RATE: 77 BPM | SYSTOLIC BLOOD PRESSURE: 120 MMHG | TEMPERATURE: 97 F | BODY MASS INDEX: 28.42 KG/M2 | WEIGHT: 187.5 LBS | DIASTOLIC BLOOD PRESSURE: 82 MMHG | HEIGHT: 68 IN

## 2023-11-29 DIAGNOSIS — M34.9 SCLERODERMA: ICD-10-CM

## 2023-11-29 DIAGNOSIS — M06.9 RHEUMATOID ARTHRITIS: ICD-10-CM

## 2023-11-29 DIAGNOSIS — R89.4 HEPATITIS B CORE ANTIBODY POSITIVE: ICD-10-CM

## 2023-11-29 DIAGNOSIS — B18.2 CHRONIC HEPATITIS C: ICD-10-CM

## 2023-11-29 DIAGNOSIS — Z79.899 HIGH RISK MEDICATION USE: ICD-10-CM

## 2023-11-29 DIAGNOSIS — R76.8 HEPATITIS B CORE ANTIBODY POSITIVE: ICD-10-CM

## 2023-11-29 DIAGNOSIS — Z71.89 VACCINE COUNSELING: ICD-10-CM

## 2023-11-29 DIAGNOSIS — R10.9 ABDOMINAL PAIN, UNSPECIFIED ABDOMINAL LOCATION: ICD-10-CM

## 2023-11-29 PROCEDURE — 99214 OFFICE O/P EST MOD 30 MIN: CPT | Performed by: PHYSICIAN ASSISTANT

## 2023-11-29 RX ORDER — TENOFOVIR ALAFENAMIDE 25 MG/1
TAKE 1 TABLET BY MOUTH ONCE DAILY WITH FOOD TABLET ORAL
Qty: 90 | Refills: 0

## 2023-11-29 RX ORDER — FINASTERIDE 5 MG/1
1 TABLET TABLET, FILM COATED ORAL ONCE A DAY
Status: ACTIVE | COMMUNITY

## 2023-11-29 RX ORDER — TAMSULOSIN HYDROCHLORIDE 0.4 MG/1
1 CAPSULE CAPSULE ORAL ONCE A DAY
Status: ACTIVE | COMMUNITY

## 2023-11-29 RX ORDER — OLMESARTAN MEDOXOMIL 40 MG/1
1 TABLET TABLET, FILM COATED ORAL ONCE A DAY
Status: ACTIVE | COMMUNITY

## 2023-11-29 RX ORDER — PANTOPRAZOLE SODIUM 40 MG/1
TAKE 1 TABLET BY MOUTH ONCE DAILY TABLET, DELAYED RELEASE ORAL
Qty: 30 TABLET | Refills: 10 | Status: ACTIVE | COMMUNITY

## 2023-11-29 RX ORDER — HYDROCODONE BITARTRATE AND ACETAMINOPHEN 5; 325 MG/1; MG/1
1 TABLET AS NEEDED TABLET ORAL
Status: ACTIVE | COMMUNITY

## 2023-11-29 RX ORDER — TENOFOVIR ALAFENAMIDE 25 MG/1
TAKE 1 TABLET BY MOUTH ONCE DAILY WITH FOOD TABLET ORAL
Qty: 90 | Refills: 0 | Status: ACTIVE | COMMUNITY
End: 2024-02-19

## 2023-11-29 RX ORDER — PREGABALIN 75 MG/1
1 CAPSULE CAPSULE ORAL TWICE A DAY
Status: ON HOLD | COMMUNITY

## 2023-11-29 RX ORDER — LISINOPRIL 40 MG/1
1 TABLET TABLET ORAL ONCE A DAY
Status: ACTIVE | COMMUNITY

## 2023-11-29 RX ORDER — FLUTICASONE FUROATE, UMECLIDINIUM BROMIDE AND VILANTEROL TRIFENATATE 100; 62.5; 25 UG/1; UG/1; UG/1
1 PUFF POWDER RESPIRATORY (INHALATION) ONCE A DAY
Status: ACTIVE | COMMUNITY

## 2023-11-29 RX ORDER — RIVAROXABAN 20 MG/1
1 TABLET WITH FOOD TABLET, FILM COATED ORAL ONCE A DAY
Status: ACTIVE | COMMUNITY

## 2023-11-29 RX ORDER — GLIPIZIDE 5 MG/1
1 TABLET 30 MINUTES BEFORE BREAKFAST TABLET ORAL ONCE A DAY
Status: ON HOLD | COMMUNITY

## 2023-11-29 RX ORDER — HUMAN INSULIN 100 [IU]/ML
30 UNITS INJECTION, SUSPENSION SUBCUTANEOUS BID
Status: ACTIVE | COMMUNITY

## 2023-11-29 RX ORDER — GLIMEPIRIDE 4 MG/1
1 TABLET WITH BREAKFAST OR THE FIRST MAIN MEAL OF THE DAY TABLET ORAL ONCE A DAY
Status: ACTIVE | COMMUNITY

## 2023-11-29 RX ORDER — METOPROLOL TARTRATE 100 MG/1
1 TABLET WITH FOOD TABLET, FILM COATED ORAL TWICE A DAY
Status: ACTIVE | COMMUNITY

## 2023-11-29 RX ORDER — LOPERAMIDE HYDROCHLORIDE 2 MG/1
1 CAPSULE AS NEEDED CAPSULE ORAL
Status: ON HOLD | COMMUNITY

## 2023-11-29 RX ORDER — SILDENAFIL 100 MG/1
1 TABLET AS NEEDED TABLET, FILM COATED ORAL ONCE A DAY
Status: ON HOLD | COMMUNITY

## 2023-11-29 NOTE — PHYSICAL EXAM NECK/THYROID:
normal appearance Mucosal Advancement Flap Text: Given the location of the defect, shape of the defect and the proximity to free margins a mucosal advancement flap was deemed most appropriate. Incisions were made with a 15 blade scalpel in the appropriate fashion along the cutaneous vermilion border and the mucosal lip. The remaining actinically damaged mucosal tissue was excised.  The mucosal advancement flap was then elevated to the gingival sulcus with care taken to preserve the neurovascular structures and advanced into the primary defect. Care was taken to ensure that precise realignment of the vermilion border was achieved.
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
## 2023-11-30 ENCOUNTER — LAB OUTSIDE AN ENCOUNTER (OUTPATIENT)
Dept: URBAN - METROPOLITAN AREA CLINIC 86 | Facility: CLINIC | Age: 63
End: 2023-11-30

## 2023-12-09 LAB
A/G RATIO: 1.7
ABSOLUTE BASOPHILS: 11
ABSOLUTE EOSINOPHILS: 72
ABSOLUTE LYMPHOCYTES: 1015
ABSOLUTE MONOCYTES: 486
ABSOLUTE NEUTROPHILS: 2016
ALBUMIN: 4.2
ALKALINE PHOSPHATASE: 78
ALT (SGPT): 22
AST (SGOT): 24
BASOPHILS: 0.3
BILIRUBIN, TOTAL: 0.6
BUN/CREATININE RATIO: (no result)
BUN: 23
CALCIUM: 9.1
CARBON DIOXIDE, TOTAL: 24
CHLORIDE: 103
CREATININE: 1.18
EGFR: 68
EOSINOPHILS: 2
GLOBULIN, TOTAL: 2.5
GLUCOSE: 186
HBV LOG10: NOT DETECTED
HEMATOCRIT: 45.1
HEMOGLOBIN: 15.3
HEPATITIS B SURFACE ANTIGEN: (no result)
HEPATITIS B VIRUS DNA: NOT DETECTED
LYMPHOCYTES: 28.2
MCH: 30.1
MCHC: 33.9
MCV: 88.6
MONOCYTES: 13.5
MPV: 12.2
NEUTROPHILS: 56
PLATELET COUNT: 168
POTASSIUM: 4
PROTEIN, TOTAL: 6.7
RDW: 13.8
RED BLOOD CELL COUNT: 5.09
SODIUM: 137
WHITE BLOOD CELL COUNT: 3.6

## 2023-12-11 ENCOUNTER — TELEPHONE ENCOUNTER (OUTPATIENT)
Dept: URBAN - METROPOLITAN AREA CLINIC 86 | Facility: CLINIC | Age: 63
End: 2023-12-11

## 2023-12-11 NOTE — HPI-TODAY'S VISIT:
Dear Selvin Devries,  The recent December 6 labs were sent to me.  The glucose 186, this is elevated if you are fasting.  Creatinine 1.1, sodium 137, potassium 4.0, bilirubin 0.6, alkaline phosphatase 78, AST 24, ALT 22.  The hepatitis B virus DNA was not detected.  The white blood cells slightly low at 3.6, please share this with the primary care.  Hemoglobin 15.3, MCV 88, platelets 168.  The hepatitis B surface antigen was nonreactive.  No evidence of hepatitis B issue here.  Still need to continue the Vemlidy. Kat Garg PA-C

## 2023-12-12 ENCOUNTER — CLAIMS CREATED FROM THE CLAIM WINDOW (OUTPATIENT)
Dept: URBAN - METROPOLITAN AREA CLINIC 92 | Facility: CLINIC | Age: 63
End: 2023-12-12
Payer: COMMERCIAL

## 2023-12-12 VITALS
BODY MASS INDEX: 28.34 KG/M2 | DIASTOLIC BLOOD PRESSURE: 71 MMHG | SYSTOLIC BLOOD PRESSURE: 118 MMHG | TEMPERATURE: 96.9 F | WEIGHT: 187 LBS | HEART RATE: 83 BPM | HEIGHT: 68 IN

## 2023-12-12 DIAGNOSIS — R13.19 ESOPHAGEAL DYSPHAGIA: ICD-10-CM

## 2023-12-12 DIAGNOSIS — R14.0 ABDOMINAL BLOATING: ICD-10-CM

## 2023-12-12 DIAGNOSIS — K64.8 INTERNAL HEMORRHOID: ICD-10-CM

## 2023-12-12 DIAGNOSIS — K21.9 GASTROESOPHAGEAL REFLUX DISEASE WITHOUT ESOPHAGITIS: ICD-10-CM

## 2023-12-12 PROCEDURE — 99213 OFFICE O/P EST LOW 20 MIN: CPT

## 2023-12-12 RX ORDER — PANTOPRAZOLE SODIUM 40 MG/1
TAKE 1 TABLET BY MOUTH ONCE DAILY TABLET, DELAYED RELEASE ORAL
Qty: 30 TABLET | Refills: 10 | Status: ACTIVE | COMMUNITY

## 2023-12-12 RX ORDER — GLIPIZIDE 5 MG/1
1 TABLET 30 MINUTES BEFORE BREAKFAST TABLET ORAL ONCE A DAY
Status: ACTIVE | COMMUNITY

## 2023-12-12 RX ORDER — METOPROLOL TARTRATE 100 MG/1
1 TABLET WITH FOOD TABLET, FILM COATED ORAL TWICE A DAY
Status: ACTIVE | COMMUNITY

## 2023-12-12 RX ORDER — LOPERAMIDE HYDROCHLORIDE 2 MG/1
1 CAPSULE AS NEEDED CAPSULE ORAL
Status: ACTIVE | COMMUNITY

## 2023-12-12 RX ORDER — TENOFOVIR ALAFENAMIDE 25 MG/1
TAKE 1 TABLET BY MOUTH ONCE DAILY WITH FOOD TABLET ORAL
Qty: 90 | Refills: 0 | Status: ACTIVE | COMMUNITY

## 2023-12-12 RX ORDER — OLMESARTAN MEDOXOMIL 40 MG/1
1 TABLET TABLET, FILM COATED ORAL ONCE A DAY
Status: ACTIVE | COMMUNITY

## 2023-12-12 RX ORDER — TAMSULOSIN HYDROCHLORIDE 0.4 MG/1
1 CAPSULE CAPSULE ORAL ONCE A DAY
Status: ACTIVE | COMMUNITY

## 2023-12-12 RX ORDER — PREGABALIN 75 MG/1
1 CAPSULE CAPSULE ORAL TWICE A DAY
Status: ACTIVE | COMMUNITY

## 2023-12-12 RX ORDER — RIVAROXABAN 20 MG/1
1 TABLET WITH FOOD TABLET, FILM COATED ORAL ONCE A DAY
Status: ACTIVE | COMMUNITY

## 2023-12-12 RX ORDER — PANTOPRAZOLE SODIUM 40 MG/1
1 TABLET TABLET, DELAYED RELEASE ORAL TWICE A DAY
Qty: 180 TABLET | Refills: 0 | OUTPATIENT
Start: 2023-12-12

## 2023-12-12 RX ORDER — FLUTICASONE FUROATE, UMECLIDINIUM BROMIDE AND VILANTEROL TRIFENATATE 100; 62.5; 25 UG/1; UG/1; UG/1
1 PUFF POWDER RESPIRATORY (INHALATION) ONCE A DAY
Status: ACTIVE | COMMUNITY

## 2023-12-12 RX ORDER — LISINOPRIL 40 MG/1
1 TABLET TABLET ORAL ONCE A DAY
Status: ACTIVE | COMMUNITY

## 2023-12-12 RX ORDER — GLIMEPIRIDE 4 MG/1
1 TABLET WITH BREAKFAST OR THE FIRST MAIN MEAL OF THE DAY TABLET ORAL ONCE A DAY
Status: ACTIVE | COMMUNITY

## 2023-12-12 RX ORDER — FINASTERIDE 5 MG/1
1 TABLET TABLET, FILM COATED ORAL ONCE A DAY
Status: ACTIVE | COMMUNITY

## 2023-12-12 RX ORDER — HYDROCODONE BITARTRATE AND ACETAMINOPHEN 5; 325 MG/1; MG/1
1 TABLET AS NEEDED TABLET ORAL
Status: ACTIVE | COMMUNITY

## 2023-12-12 RX ORDER — HUMAN INSULIN 100 [IU]/ML
30 UNITS INJECTION, SUSPENSION SUBCUTANEOUS BID
Status: ACTIVE | COMMUNITY

## 2023-12-12 RX ORDER — SILDENAFIL 100 MG/1
1 TABLET AS NEEDED TABLET, FILM COATED ORAL ONCE A DAY
Status: ACTIVE | COMMUNITY

## 2023-12-12 NOTE — HPI-TODAY'S VISIT:
63-year-old male patient presents today for follow-up visit.  He is a patient of the liver center and is currently on Vemlidy due to needing to be on rituximab for RA and scleroderma treatment.  He has longstanding history of reflux issues.  His most recent upper endoscopy was 2/2023 that demonstrated no abnormalities.  He is taking pantoprazole 40 mg daily and since last visit I recommended he increased this to twice a day.  He was also having issues with dysphagia, so I obtained a barium swallow which was negative.  Today he notes that his reflux is much better and his dysphagia has resolved.  He has no odynophagia, nausea, vomiting, NSAID use. He now complaints of increase bloating, gas in the abdomen. Has gurgling as well. No postprandial fullness. I ordered a SIBO test which he has not done.   He notes alternating bowel movements which he has had for some time. He states he thinks this is due to diet. He has a very poor diet consisting of mainly fast food. He says he has lost any motivation to eat healthy. He saw a dietitian in the past which didn't help. Recently he has been having some irritation with his internal hemorrhoids.  I did recommend internal hemorrhoid banding at the last visit but again patient states that he is too afraid to do this.  He notes he has been using OTC suppositories which is working now.  He does not notice any blood in the stool.  Last colonoscopy was 6-3-2021 that demonstrated internal hemorrhoids, 2 mm tubular adenoma recommended repeating in 5 years.  Of note he was hospitalized in February 2023 due to 3-day history of melena with no abdominal pain nausea or vomiting.  He was found to have significant anemia and evidence of active jejunal bleed on CTA.  He was taken for urgent angiography but no active bleeding was found.  PillCam was obtained which was negative and upper endoscopy was also negative.  Patient was stabilized and discharged.  Since then he has had no issues and hemoglobin has been stable.

## 2024-02-12 ENCOUNTER — LAB (OUTPATIENT)
Dept: URBAN - METROPOLITAN AREA CLINIC 86 | Facility: CLINIC | Age: 64
End: 2024-02-12

## 2024-02-15 ENCOUNTER — OV EP (OUTPATIENT)
Dept: URBAN - METROPOLITAN AREA CLINIC 86 | Facility: CLINIC | Age: 64
End: 2024-02-15
Payer: COMMERCIAL

## 2024-02-15 ENCOUNTER — US W/ DOPP (OUTPATIENT)
Dept: URBAN - METROPOLITAN AREA CLINIC 91 | Facility: CLINIC | Age: 64
End: 2024-02-15
Payer: COMMERCIAL

## 2024-02-15 VITALS
TEMPERATURE: 96.9 F | DIASTOLIC BLOOD PRESSURE: 89 MMHG | BODY MASS INDEX: 26.67 KG/M2 | WEIGHT: 176 LBS | HEART RATE: 86 BPM | HEIGHT: 68 IN | SYSTOLIC BLOOD PRESSURE: 169 MMHG

## 2024-02-15 DIAGNOSIS — R89.4 HEPATITIS B CORE ANTIBODY POSITIVE: ICD-10-CM

## 2024-02-15 DIAGNOSIS — B18.2 CHRONIC HEPATITIS C: ICD-10-CM

## 2024-02-15 DIAGNOSIS — R76.8 HEPATITIS B CORE ANTIBODY POSITIVE: ICD-10-CM

## 2024-02-15 DIAGNOSIS — R10.9 ABDOMINAL PAIN, UNSPECIFIED ABDOMINAL LOCATION: ICD-10-CM

## 2024-02-15 DIAGNOSIS — Z79.899 HIGH RISK MEDICATION USE: ICD-10-CM

## 2024-02-15 DIAGNOSIS — R93.2 ABNORMAL ULTRASOUND OF LIVER: ICD-10-CM

## 2024-02-15 DIAGNOSIS — M34.9 SCLERODERMA: ICD-10-CM

## 2024-02-15 DIAGNOSIS — M06.9 RHEUMATOID ARTHRITIS: ICD-10-CM

## 2024-02-15 DIAGNOSIS — Z71.89 VACCINE COUNSELING: ICD-10-CM

## 2024-02-15 PROCEDURE — 93975 VASCULAR STUDY: CPT

## 2024-02-15 PROCEDURE — 76705 ECHO EXAM OF ABDOMEN: CPT

## 2024-02-15 PROCEDURE — 99214 OFFICE O/P EST MOD 30 MIN: CPT | Performed by: PHYSICIAN ASSISTANT

## 2024-02-15 RX ORDER — HUMAN INSULIN 100 [IU]/ML
30 UNITS INJECTION, SUSPENSION SUBCUTANEOUS BID
Status: ACTIVE | COMMUNITY

## 2024-02-15 RX ORDER — TENOFOVIR ALAFENAMIDE 25 MG/1
TAKE 1 TABLET BY MOUTH ONCE DAILY WITH FOOD TABLET ORAL
Qty: 90 | Refills: 0 | Status: ACTIVE | COMMUNITY

## 2024-02-15 RX ORDER — GLIPIZIDE 5 MG/1
1 TABLET 30 MINUTES BEFORE BREAKFAST TABLET ORAL ONCE A DAY
Status: ACTIVE | COMMUNITY

## 2024-02-15 RX ORDER — LOPERAMIDE HYDROCHLORIDE 2 MG/1
1 CAPSULE AS NEEDED CAPSULE ORAL
Status: ACTIVE | COMMUNITY

## 2024-02-15 RX ORDER — RIVAROXABAN 20 MG/1
1 TABLET WITH FOOD TABLET, FILM COATED ORAL ONCE A DAY
Status: ACTIVE | COMMUNITY

## 2024-02-15 RX ORDER — TENOFOVIR ALAFENAMIDE 25 MG/1
TAKE 1 TABLET BY MOUTH ONCE DAILY WITH FOOD TABLET ORAL
Qty: 90 | Refills: 0

## 2024-02-15 RX ORDER — PANTOPRAZOLE SODIUM 40 MG/1
TAKE 1 TABLET BY MOUTH ONCE DAILY TABLET, DELAYED RELEASE ORAL
Qty: 30 TABLET | Refills: 10 | Status: ACTIVE | COMMUNITY

## 2024-02-15 RX ORDER — LISINOPRIL 40 MG/1
1 TABLET TABLET ORAL ONCE A DAY
Status: ON HOLD | COMMUNITY

## 2024-02-15 RX ORDER — METOPROLOL TARTRATE 100 MG/1
1 TABLET WITH FOOD TABLET, FILM COATED ORAL TWICE A DAY
Status: ACTIVE | COMMUNITY

## 2024-02-15 RX ORDER — SILDENAFIL 100 MG/1
1 TABLET AS NEEDED TABLET, FILM COATED ORAL ONCE A DAY
Status: ACTIVE | COMMUNITY

## 2024-02-15 RX ORDER — OLMESARTAN MEDOXOMIL 40 MG/1
1 TABLET TABLET, FILM COATED ORAL ONCE A DAY
Status: ACTIVE | COMMUNITY

## 2024-02-15 RX ORDER — FINASTERIDE 5 MG/1
1 TABLET TABLET, FILM COATED ORAL ONCE A DAY
Status: ACTIVE | COMMUNITY

## 2024-02-15 RX ORDER — HYDROCODONE BITARTRATE AND ACETAMINOPHEN 5; 325 MG/1; MG/1
1 TABLET AS NEEDED TABLET ORAL
Status: ACTIVE | COMMUNITY

## 2024-02-15 RX ORDER — PANTOPRAZOLE SODIUM 40 MG/1
1 TABLET TABLET, DELAYED RELEASE ORAL TWICE A DAY
Qty: 180 TABLET | Refills: 0 | Status: ACTIVE | COMMUNITY
Start: 2023-12-12

## 2024-02-15 RX ORDER — GLIMEPIRIDE 4 MG/1
1 TABLET WITH BREAKFAST OR THE FIRST MAIN MEAL OF THE DAY TABLET ORAL ONCE A DAY
Status: ACTIVE | COMMUNITY

## 2024-02-15 RX ORDER — TAMSULOSIN HYDROCHLORIDE 0.4 MG/1
1 CAPSULE CAPSULE ORAL ONCE A DAY
Status: ACTIVE | COMMUNITY

## 2024-02-15 RX ORDER — PREGABALIN 75 MG/1
1 CAPSULE CAPSULE ORAL TWICE A DAY
Status: ACTIVE | COMMUNITY

## 2024-02-15 RX ORDER — FLUTICASONE FUROATE, UMECLIDINIUM BROMIDE AND VILANTEROL TRIFENATATE 100; 62.5; 25 UG/1; UG/1; UG/1
1 PUFF POWDER RESPIRATORY (INHALATION) ONCE A DAY
Status: ACTIVE | COMMUNITY
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
## 2024-02-19 ENCOUNTER — LAB (OUTPATIENT)
Dept: URBAN - METROPOLITAN AREA CLINIC 86 | Facility: CLINIC | Age: 64
End: 2024-02-19

## 2024-02-19 PROBLEM — 300331000: Status: ACTIVE | Noted: 2024-02-19

## 2024-03-01 ENCOUNTER — OV EP (OUTPATIENT)
Dept: URBAN - METROPOLITAN AREA CLINIC 92 | Facility: CLINIC | Age: 64
End: 2024-03-01

## 2024-03-01 RX ORDER — METOPROLOL TARTRATE 100 MG/1
1 TABLET WITH FOOD TABLET, FILM COATED ORAL TWICE A DAY
COMMUNITY

## 2024-03-01 RX ORDER — GLIMEPIRIDE 4 MG/1
1 TABLET WITH BREAKFAST OR THE FIRST MAIN MEAL OF THE DAY TABLET ORAL ONCE A DAY
COMMUNITY

## 2024-03-01 RX ORDER — RIVAROXABAN 20 MG/1
1 TABLET WITH FOOD TABLET, FILM COATED ORAL ONCE A DAY
COMMUNITY

## 2024-03-01 RX ORDER — LISINOPRIL 40 MG/1
1 TABLET TABLET ORAL ONCE A DAY
COMMUNITY

## 2024-03-01 RX ORDER — PREGABALIN 75 MG/1
1 CAPSULE CAPSULE ORAL TWICE A DAY
COMMUNITY

## 2024-03-01 RX ORDER — LOPERAMIDE HYDROCHLORIDE 2 MG/1
1 CAPSULE AS NEEDED CAPSULE ORAL
COMMUNITY

## 2024-03-01 RX ORDER — PANTOPRAZOLE SODIUM 40 MG/1
TAKE 1 TABLET BY MOUTH ONCE DAILY TABLET, DELAYED RELEASE ORAL
Qty: 30 TABLET | Refills: 10 | COMMUNITY

## 2024-03-01 RX ORDER — FLUTICASONE FUROATE, UMECLIDINIUM BROMIDE AND VILANTEROL TRIFENATATE 100; 62.5; 25 UG/1; UG/1; UG/1
1 PUFF POWDER RESPIRATORY (INHALATION) ONCE A DAY
COMMUNITY

## 2024-03-01 RX ORDER — HUMAN INSULIN 100 [IU]/ML
30 UNITS INJECTION, SUSPENSION SUBCUTANEOUS BID
COMMUNITY

## 2024-03-01 RX ORDER — TAMSULOSIN HYDROCHLORIDE 0.4 MG/1
1 CAPSULE CAPSULE ORAL ONCE A DAY
COMMUNITY

## 2024-03-01 RX ORDER — HYDROCODONE BITARTRATE AND ACETAMINOPHEN 5; 325 MG/1; MG/1
1 TABLET AS NEEDED TABLET ORAL
COMMUNITY

## 2024-03-01 RX ORDER — FINASTERIDE 5 MG/1
1 TABLET TABLET, FILM COATED ORAL ONCE A DAY
COMMUNITY

## 2024-03-01 RX ORDER — GLIPIZIDE 5 MG/1
1 TABLET 30 MINUTES BEFORE BREAKFAST TABLET ORAL ONCE A DAY
COMMUNITY

## 2024-03-01 RX ORDER — PANTOPRAZOLE SODIUM 40 MG/1
1 TABLET TABLET, DELAYED RELEASE ORAL TWICE A DAY
Qty: 180 TABLET | Refills: 0 | COMMUNITY
Start: 2023-12-12

## 2024-03-01 RX ORDER — SILDENAFIL 100 MG/1
1 TABLET AS NEEDED TABLET, FILM COATED ORAL ONCE A DAY
COMMUNITY

## 2024-03-01 RX ORDER — OLMESARTAN MEDOXOMIL 40 MG/1
1 TABLET TABLET, FILM COATED ORAL ONCE A DAY
COMMUNITY

## 2024-03-01 RX ORDER — TENOFOVIR ALAFENAMIDE 25 MG/1
TAKE 1 TABLET BY MOUTH ONCE DAILY WITH FOOD TABLET ORAL
Qty: 90 | Refills: 0 | COMMUNITY

## 2024-03-01 NOTE — HPI-TODAY'S VISIT:
63-year-old male patient presents today for follow-up visit.  He is a patient of the liver center and is currently on Vemlidy due to needing to be on rituximab for RA and scleroderma treatment.  He has longstanding history of reflux issues.  His most recent upper endoscopy was 2/2023 that demonstrated no abnormalities.  He is taking pantoprazole 40 mg daily and since last visit I recommended he increased this to twice a day.  He was also having issues with dysphagia, so I obtained a barium swallow which was negative.  Today he notes that his reflux is much better and his dysphagia has resolved.  He has no odynophagia, nausea, vomiting, NSAID use. He now complaints of increase bloating, gas in the abdomen. Has gurgling as well. No postprandial fullness. I ordered a SIBO test which he has not done.   He notes alternating bowel movements which he has had for some time. He states he thinks this is due to diet. He has a very poor diet consisting of mainly fast food. He says he has lost any motivation to eat healthy. He saw a dietitian in the past which didn't help. Recently he has been having some irritation with his internal hemorrhoids.  I did recommend internal hemorrhoid banding at the last visit but again patient states that he is too afraid to do this.  He notes he has been using OTC suppositories which is working now.  He does not notice any blood in the stool.  Last colonoscopy was 6-3-2021 that demonstrated internal hemorrhoids, 2 mm tubular adenoma recommended repeating in 5 years.  Of note he was hospitalized in February 2023 due to 3-day history of melena with no abdominal pain nausea or vomiting.  He was found to have significant anemia and evidence of active jejunal bleed on CTA.  He was taken for urgent angiography but no active bleeding was found.  PillCam was obtained which was negative and upper endoscopy was also negative.  Patient was stabilized and discharged.  Since then he has had no issues and hemoglobin has been stable.   US 2/15/24 did show hemangioma and recommeed MRI which was sent

## 2024-03-14 ENCOUNTER — LAB (OUTPATIENT)
Dept: URBAN - METROPOLITAN AREA CLINIC 86 | Facility: CLINIC | Age: 64
End: 2024-03-14

## 2024-03-14 ENCOUNTER — OV EP (OUTPATIENT)
Dept: URBAN - METROPOLITAN AREA CLINIC 92 | Facility: CLINIC | Age: 64
End: 2024-03-14

## 2024-03-14 RX ORDER — LOPERAMIDE HYDROCHLORIDE 2 MG/1
1 CAPSULE AS NEEDED CAPSULE ORAL
COMMUNITY

## 2024-03-14 RX ORDER — PANTOPRAZOLE SODIUM 40 MG/1
1 TABLET TABLET, DELAYED RELEASE ORAL TWICE A DAY
Qty: 180 TABLET | Refills: 0 | OUTPATIENT

## 2024-03-14 RX ORDER — RIVAROXABAN 20 MG/1
1 TABLET WITH FOOD TABLET, FILM COATED ORAL ONCE A DAY
COMMUNITY

## 2024-03-14 RX ORDER — SILDENAFIL 100 MG/1
1 TABLET AS NEEDED TABLET, FILM COATED ORAL ONCE A DAY
COMMUNITY

## 2024-03-14 RX ORDER — PREGABALIN 75 MG/1
1 CAPSULE CAPSULE ORAL TWICE A DAY
COMMUNITY

## 2024-03-14 RX ORDER — TAMSULOSIN HYDROCHLORIDE 0.4 MG/1
1 CAPSULE CAPSULE ORAL ONCE A DAY
COMMUNITY

## 2024-03-14 RX ORDER — LISINOPRIL 40 MG/1
1 TABLET TABLET ORAL ONCE A DAY
COMMUNITY

## 2024-03-14 RX ORDER — GLIMEPIRIDE 4 MG/1
1 TABLET WITH BREAKFAST OR THE FIRST MAIN MEAL OF THE DAY TABLET ORAL ONCE A DAY
COMMUNITY

## 2024-03-14 RX ORDER — PANTOPRAZOLE SODIUM 40 MG/1
TAKE 1 TABLET BY MOUTH ONCE DAILY TABLET, DELAYED RELEASE ORAL
Qty: 30 TABLET | Refills: 10 | COMMUNITY

## 2024-03-14 RX ORDER — GLIPIZIDE 5 MG/1
1 TABLET 30 MINUTES BEFORE BREAKFAST TABLET ORAL ONCE A DAY
COMMUNITY

## 2024-03-14 RX ORDER — FINASTERIDE 5 MG/1
1 TABLET TABLET, FILM COATED ORAL ONCE A DAY
COMMUNITY

## 2024-03-14 RX ORDER — METOPROLOL TARTRATE 100 MG/1
1 TABLET WITH FOOD TABLET, FILM COATED ORAL TWICE A DAY
COMMUNITY

## 2024-03-14 RX ORDER — PANTOPRAZOLE SODIUM 40 MG/1
1 TABLET TABLET, DELAYED RELEASE ORAL TWICE A DAY
Qty: 180 TABLET | Refills: 0 | COMMUNITY
Start: 2023-12-12

## 2024-03-14 RX ORDER — FLUTICASONE FUROATE, UMECLIDINIUM BROMIDE AND VILANTEROL TRIFENATATE 100; 62.5; 25 UG/1; UG/1; UG/1
1 PUFF POWDER RESPIRATORY (INHALATION) ONCE A DAY
COMMUNITY

## 2024-03-14 RX ORDER — OLMESARTAN MEDOXOMIL 40 MG/1
1 TABLET TABLET, FILM COATED ORAL ONCE A DAY
COMMUNITY

## 2024-03-14 RX ORDER — HUMAN INSULIN 100 [IU]/ML
30 UNITS INJECTION, SUSPENSION SUBCUTANEOUS BID
COMMUNITY

## 2024-03-14 RX ORDER — TENOFOVIR ALAFENAMIDE 25 MG/1
TAKE 1 TABLET BY MOUTH ONCE DAILY WITH FOOD TABLET ORAL
Qty: 90 | Refills: 0 | COMMUNITY

## 2024-03-14 RX ORDER — HYDROCODONE BITARTRATE AND ACETAMINOPHEN 5; 325 MG/1; MG/1
1 TABLET AS NEEDED TABLET ORAL
COMMUNITY

## 2024-03-14 NOTE — HPI-TODAY'S VISIT:
63-year-old male patient presents today for follow-up visit.  He is a patient of the liver center and is currently on Vemlidy due to needing to be on rituximab for RA and scleroderma treatment.  He has longstanding history of reflux issues.  His most recent upper endoscopy was 2/2023 that demonstrated no abnormalities.  He is taking pantoprazole 40 mg daily and since last visit I recommended he increased this to twice a day.  He was also having issues with dysphagia, so I obtained a barium swallow which was negative.  Today he notes that his reflux is much better and his dysphagia has resolved.  He has no odynophagia, nausea, vomiting, NSAID use. He now complaints of increase bloating, gas in the abdomen. Has gurgling as well. No postprandial fullness. I ordered a SIBO test which he has not done.   He notes alternating bowel movements which he has had for some time. He states he thinks this is due to diet. He has a very poor diet consisting of mainly fast food. He says he has lost any motivation to eat healthy. He saw a dietitian in the past which didn't help. Recently he has been having some irritation with his internal hemorrhoids.  I did recommend internal hemorrhoid banding at the last visit but again patient states that he is too afraid to do this.  He notes he has been using OTC suppositories which is working now.  He does not notice any blood in the stool.  Last colonoscopy was 6-3-2021 that demonstrated internal hemorrhoids, 2 mm tubular adenoma recommended repeating in 5 years.  Of note he was hospitalized in February 2023 due to 3-day history of melena with no abdominal pain nausea or vomiting.  He was found to have significant anemia and evidence of active jejunal bleed on CTA.  He was taken for urgent angiography but no active bleeding was found.  PillCam was obtained which was negative and upper endoscopy was also negative.  Patient was stabilized and discharged.  Since then he has had no issues and hemoglobin has been stable.   US 2/15/24 did show hemangioma and recommeed MRI which was sent No SIBO test done Ultrasound 2- demonstrated small echogenic foci within the liver could be hemangioma consider MRI, patent hepatic vasculature.  MRI was ordered

## 2024-03-29 ENCOUNTER — OV EP (OUTPATIENT)
Dept: URBAN - METROPOLITAN AREA CLINIC 86 | Facility: CLINIC | Age: 64
End: 2024-03-29
Payer: COMMERCIAL

## 2024-03-29 VITALS
DIASTOLIC BLOOD PRESSURE: 78 MMHG | BODY MASS INDEX: 27.13 KG/M2 | HEIGHT: 68 IN | HEART RATE: 81 BPM | WEIGHT: 179 LBS | SYSTOLIC BLOOD PRESSURE: 132 MMHG | TEMPERATURE: 96.3 F

## 2024-03-29 DIAGNOSIS — Z79.899 HIGH RISK MEDICATION USE: ICD-10-CM

## 2024-03-29 DIAGNOSIS — K76.9 LIVER LESION: ICD-10-CM

## 2024-03-29 DIAGNOSIS — M06.9 RHEUMATOID ARTHRITIS: ICD-10-CM

## 2024-03-29 DIAGNOSIS — R76.8 HEPATITIS B CORE ANTIBODY POSITIVE: ICD-10-CM

## 2024-03-29 DIAGNOSIS — B18.2 CHRONIC HEPATITIS C: ICD-10-CM

## 2024-03-29 DIAGNOSIS — M34.9 SCLERODERMA: ICD-10-CM

## 2024-03-29 DIAGNOSIS — R89.4 HEPATITIS B CORE ANTIBODY POSITIVE: ICD-10-CM

## 2024-03-29 DIAGNOSIS — R10.9 ABDOMINAL PAIN, UNSPECIFIED ABDOMINAL LOCATION: ICD-10-CM

## 2024-03-29 DIAGNOSIS — Z71.89 VACCINE COUNSELING: ICD-10-CM

## 2024-03-29 DIAGNOSIS — R10.13 EPIGASTRIC PAIN: ICD-10-CM

## 2024-03-29 DIAGNOSIS — R63.4 WEIGHT LOSS: ICD-10-CM

## 2024-03-29 PROCEDURE — 99214 OFFICE O/P EST MOD 30 MIN: CPT | Performed by: PHYSICIAN ASSISTANT

## 2024-03-29 RX ORDER — GLIMEPIRIDE 4 MG/1
1 TABLET WITH BREAKFAST OR THE FIRST MAIN MEAL OF THE DAY TABLET ORAL ONCE A DAY
Status: ACTIVE | COMMUNITY

## 2024-03-29 RX ORDER — LISINOPRIL 40 MG/1
1 TABLET TABLET ORAL ONCE A DAY
Status: ACTIVE | COMMUNITY

## 2024-03-29 RX ORDER — TENOFOVIR ALAFENAMIDE 25 MG/1
TAKE 1 TABLET BY MOUTH ONCE DAILY WITH FOOD TABLET ORAL
Qty: 90 | Refills: 0 | Status: ACTIVE | COMMUNITY

## 2024-03-29 RX ORDER — FINASTERIDE 5 MG/1
1 TABLET TABLET, FILM COATED ORAL ONCE A DAY
Status: ACTIVE | COMMUNITY

## 2024-03-29 RX ORDER — PANTOPRAZOLE SODIUM 40 MG/1
TAKE 1 TABLET BY MOUTH ONCE DAILY TABLET, DELAYED RELEASE ORAL
Qty: 30 TABLET | Refills: 10 | Status: ACTIVE | COMMUNITY

## 2024-03-29 RX ORDER — METOPROLOL TARTRATE 100 MG/1
1 TABLET WITH FOOD TABLET, FILM COATED ORAL TWICE A DAY
Status: ACTIVE | COMMUNITY

## 2024-03-29 RX ORDER — TAMSULOSIN HYDROCHLORIDE 0.4 MG/1
1 CAPSULE CAPSULE ORAL ONCE A DAY
Status: ACTIVE | COMMUNITY

## 2024-03-29 RX ORDER — RIVAROXABAN 20 MG/1
1 TABLET WITH FOOD TABLET, FILM COATED ORAL ONCE A DAY
Status: ACTIVE | COMMUNITY

## 2024-03-29 RX ORDER — FLUTICASONE FUROATE, UMECLIDINIUM BROMIDE AND VILANTEROL TRIFENATATE 100; 62.5; 25 UG/1; UG/1; UG/1
1 PUFF POWDER RESPIRATORY (INHALATION) ONCE A DAY
Status: ACTIVE | COMMUNITY

## 2024-03-29 RX ORDER — PREGABALIN 75 MG/1
1 CAPSULE CAPSULE ORAL TWICE A DAY
Status: ACTIVE | COMMUNITY

## 2024-03-29 RX ORDER — PANTOPRAZOLE SODIUM 40 MG/1
1 TABLET TABLET, DELAYED RELEASE ORAL TWICE A DAY
Qty: 180 TABLET | Refills: 0 | Status: ACTIVE | COMMUNITY

## 2024-03-29 RX ORDER — HUMAN INSULIN 100 [IU]/ML
30 UNITS INJECTION, SUSPENSION SUBCUTANEOUS BID
Status: ACTIVE | COMMUNITY

## 2024-03-29 RX ORDER — GLIPIZIDE 5 MG/1
1 TABLET 30 MINUTES BEFORE BREAKFAST TABLET ORAL ONCE A DAY
Status: ACTIVE | COMMUNITY

## 2024-03-29 RX ORDER — OLMESARTAN MEDOXOMIL 40 MG/1
1 TABLET TABLET, FILM COATED ORAL ONCE A DAY
Status: ACTIVE | COMMUNITY

## 2024-03-29 RX ORDER — SILDENAFIL 100 MG/1
1 TABLET AS NEEDED TABLET, FILM COATED ORAL ONCE A DAY
Status: ACTIVE | COMMUNITY

## 2024-03-29 RX ORDER — HYDROCODONE BITARTRATE AND ACETAMINOPHEN 5; 325 MG/1; MG/1
1 TABLET AS NEEDED TABLET ORAL
Status: ACTIVE | COMMUNITY

## 2024-03-29 RX ORDER — LOPERAMIDE HYDROCHLORIDE 2 MG/1
1 CAPSULE AS NEEDED CAPSULE ORAL
Status: ACTIVE | COMMUNITY
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
## 2024-04-02 ENCOUNTER — OV EP (OUTPATIENT)
Dept: URBAN - METROPOLITAN AREA CLINIC 92 | Facility: CLINIC | Age: 64
End: 2024-04-02
Payer: COMMERCIAL

## 2024-04-02 VITALS
HEART RATE: 85 BPM | BODY MASS INDEX: 26.83 KG/M2 | TEMPERATURE: 96.9 F | HEIGHT: 68 IN | SYSTOLIC BLOOD PRESSURE: 135 MMHG | DIASTOLIC BLOOD PRESSURE: 85 MMHG | WEIGHT: 177 LBS

## 2024-04-02 DIAGNOSIS — R14.0 ABDOMINAL BLOATING: ICD-10-CM

## 2024-04-02 DIAGNOSIS — K21.9 GASTROESOPHAGEAL REFLUX DISEASE WITHOUT ESOPHAGITIS: ICD-10-CM

## 2024-04-02 DIAGNOSIS — K58.0 IRRITABLE BOWEL SYNDROME WITH DIARRHEA: ICD-10-CM

## 2024-04-02 DIAGNOSIS — K64.8 INTERNAL HEMORRHOID: ICD-10-CM

## 2024-04-02 PROBLEM — 197125005: Status: ACTIVE | Noted: 2024-04-02

## 2024-04-02 PROCEDURE — 99214 OFFICE O/P EST MOD 30 MIN: CPT

## 2024-04-02 RX ORDER — RIFAXIMIN 550 MG/1
1 TABLET TABLET ORAL THREE TIMES A DAY
Qty: 42 TABLET | Refills: 2 | OUTPATIENT
Start: 2024-04-02 | End: 2024-05-14

## 2024-04-02 RX ORDER — METOPROLOL TARTRATE 100 MG/1
1 TABLET WITH FOOD TABLET, FILM COATED ORAL TWICE A DAY
Status: ACTIVE | COMMUNITY

## 2024-04-02 RX ORDER — PANTOPRAZOLE SODIUM 40 MG/1
TAKE 1 TABLET BY MOUTH ONCE DAILY TABLET, DELAYED RELEASE ORAL
Qty: 30 TABLET | Refills: 10 | Status: ACTIVE | COMMUNITY

## 2024-04-02 RX ORDER — OLMESARTAN MEDOXOMIL 40 MG/1
1 TABLET TABLET, FILM COATED ORAL ONCE A DAY
Status: ON HOLD | COMMUNITY

## 2024-04-02 RX ORDER — LISINOPRIL 40 MG/1
1 TABLET TABLET ORAL ONCE A DAY
Status: ON HOLD | COMMUNITY

## 2024-04-02 RX ORDER — SILDENAFIL 100 MG/1
1 TABLET AS NEEDED TABLET, FILM COATED ORAL ONCE A DAY
Status: ON HOLD | COMMUNITY

## 2024-04-02 RX ORDER — TENOFOVIR ALAFENAMIDE 25 MG/1
TAKE 1 TABLET BY MOUTH ONCE DAILY WITH FOOD TABLET ORAL
Qty: 90 | Refills: 0 | Status: ACTIVE | COMMUNITY

## 2024-04-02 RX ORDER — FLUTICASONE FUROATE, UMECLIDINIUM BROMIDE AND VILANTEROL TRIFENATATE 100; 62.5; 25 UG/1; UG/1; UG/1
1 PUFF POWDER RESPIRATORY (INHALATION) ONCE A DAY
Status: ACTIVE | COMMUNITY

## 2024-04-02 RX ORDER — GLIMEPIRIDE 4 MG/1
1 TABLET WITH BREAKFAST OR THE FIRST MAIN MEAL OF THE DAY TABLET ORAL ONCE A DAY
Status: ON HOLD | COMMUNITY

## 2024-04-02 RX ORDER — PANTOPRAZOLE SODIUM 40 MG/1
1 TABLET TABLET, DELAYED RELEASE ORAL TWICE A DAY
Qty: 180 TABLET | Refills: 0 | OUTPATIENT

## 2024-04-02 RX ORDER — FINASTERIDE 5 MG/1
1 TABLET TABLET, FILM COATED ORAL ONCE A DAY
Status: ACTIVE | COMMUNITY

## 2024-04-02 RX ORDER — HYDROCODONE BITARTRATE AND ACETAMINOPHEN 5; 325 MG/1; MG/1
1 TABLET AS NEEDED TABLET ORAL
Status: ACTIVE | COMMUNITY

## 2024-04-02 RX ORDER — PREGABALIN 75 MG/1
1 CAPSULE CAPSULE ORAL TWICE A DAY
Status: ON HOLD | COMMUNITY

## 2024-04-02 RX ORDER — GLIPIZIDE 5 MG/1
1 TABLET 30 MINUTES BEFORE BREAKFAST TABLET ORAL ONCE A DAY
Status: ACTIVE | COMMUNITY

## 2024-04-02 RX ORDER — RIVAROXABAN 20 MG/1
1 TABLET WITH FOOD TABLET, FILM COATED ORAL ONCE A DAY
Status: ACTIVE | COMMUNITY

## 2024-04-02 RX ORDER — HUMAN INSULIN 100 [IU]/ML
30 UNITS INJECTION, SUSPENSION SUBCUTANEOUS BID
Status: ACTIVE | COMMUNITY

## 2024-04-02 RX ORDER — TAMSULOSIN HYDROCHLORIDE 0.4 MG/1
1 CAPSULE CAPSULE ORAL ONCE A DAY
Status: ACTIVE | COMMUNITY

## 2024-04-02 RX ORDER — LOPERAMIDE HYDROCHLORIDE 2 MG/1
1 CAPSULE AS NEEDED CAPSULE ORAL
Status: ON HOLD | COMMUNITY

## 2024-04-02 NOTE — HPI-TODAY'S VISIT:
63-year-old male patient presents today for follow-up visit.  He is a patient of the liver center and is currently on Vemlidy due to needing to be on rituximab for RA and scleroderma treatment. He saw Liver center and US 2/15/24 did show hemangioma. MRI was ordered and has not been done yet.  He has longstanding history of reflux issues.  His most recent upper endoscopy was 2/2023 that demonstrated no abnormalities.  He is taking pantoprazole 40 mg daily and since last visit I recommended he increased this to twice a day.  He was also having issues with dysphagia, so I obtained a barium swallow which was negative.  Today he notes that his reflux is much better and his dysphagia has resolved. He did have increased sx in past 2 days and has increased his tobacco smoking. He has no odynophagia, nausea, vomiting, NSAID use. He was complaining of increase bloating, gas in the abdomen. Has gurgling as well. No postprandial fullness. I ordered a SIBO test 2x which he has not done.  He notes alternating bowel movements which he has had for some time. He states he thinks this is due to diet. He has a very poor diet consisting of mainly fast food. He says he has lost any motivation to eat healthy. He saw a dietitian in the past which didn't help. He is noting more loose BM daily. He has around 5-6 BM and can alternate from diarrhea, constipation and normal. Has increased stress in past few weeks so stool is looser. Denies hematochezia or melena.  Last colonoscopy was 6-3-2021 that demonstrated internal hemorrhoids, 2 mm tubular adenoma recommended repeating in 5 years.   He noted increased stress and depression in life lately. He would like to talk to someone about the daily stress he has and does not have much family support. He denies SI/HI.

## 2024-05-29 ENCOUNTER — DASHBOARD ENCOUNTERS (OUTPATIENT)
Age: 64
End: 2024-05-29

## 2024-06-03 ENCOUNTER — OFFICE VISIT (OUTPATIENT)
Dept: URBAN - METROPOLITAN AREA CLINIC 92 | Facility: CLINIC | Age: 64
End: 2024-06-03

## 2024-06-03 RX ORDER — PANTOPRAZOLE SODIUM 40 MG/1
TAKE 1 TABLET BY MOUTH ONCE DAILY TABLET, DELAYED RELEASE ORAL
Qty: 30 TABLET | Refills: 10 | Status: ACTIVE | COMMUNITY

## 2024-06-03 RX ORDER — LISINOPRIL 40 MG/1
1 TABLET TABLET ORAL ONCE A DAY
Status: ON HOLD | COMMUNITY

## 2024-06-03 RX ORDER — GLIMEPIRIDE 4 MG/1
1 TABLET WITH BREAKFAST OR THE FIRST MAIN MEAL OF THE DAY TABLET ORAL ONCE A DAY
Status: ON HOLD | COMMUNITY

## 2024-06-03 RX ORDER — PANTOPRAZOLE SODIUM 40 MG/1
1 TABLET TABLET, DELAYED RELEASE ORAL TWICE A DAY
Qty: 180 TABLET | Refills: 0 | OUTPATIENT

## 2024-06-03 RX ORDER — HYDROCODONE BITARTRATE AND ACETAMINOPHEN 5; 325 MG/1; MG/1
1 TABLET AS NEEDED TABLET ORAL
Status: ACTIVE | COMMUNITY

## 2024-06-03 RX ORDER — LOPERAMIDE HYDROCHLORIDE 2 MG/1
1 CAPSULE AS NEEDED CAPSULE ORAL
Status: ON HOLD | COMMUNITY

## 2024-06-03 RX ORDER — FINASTERIDE 5 MG/1
1 TABLET TABLET, FILM COATED ORAL ONCE A DAY
Status: ACTIVE | COMMUNITY

## 2024-06-03 RX ORDER — FLUTICASONE FUROATE, UMECLIDINIUM BROMIDE AND VILANTEROL TRIFENATATE 100; 62.5; 25 UG/1; UG/1; UG/1
1 PUFF POWDER RESPIRATORY (INHALATION) ONCE A DAY
Status: ACTIVE | COMMUNITY

## 2024-06-03 RX ORDER — TAMSULOSIN HYDROCHLORIDE 0.4 MG/1
1 CAPSULE CAPSULE ORAL ONCE A DAY
Status: ACTIVE | COMMUNITY

## 2024-06-03 RX ORDER — SILDENAFIL 100 MG/1
1 TABLET AS NEEDED TABLET, FILM COATED ORAL ONCE A DAY
Status: ON HOLD | COMMUNITY

## 2024-06-03 RX ORDER — PANTOPRAZOLE SODIUM 40 MG/1
1 TABLET TABLET, DELAYED RELEASE ORAL TWICE A DAY
Qty: 180 TABLET | Refills: 0 | Status: ACTIVE | COMMUNITY

## 2024-06-03 RX ORDER — TENOFOVIR ALAFENAMIDE 25 MG/1
TAKE 1 TABLET BY MOUTH ONCE DAILY WITH FOOD TABLET ORAL
Qty: 90 | Refills: 0 | Status: ACTIVE | COMMUNITY

## 2024-06-03 RX ORDER — METOPROLOL TARTRATE 100 MG/1
1 TABLET WITH FOOD TABLET, FILM COATED ORAL TWICE A DAY
Status: ACTIVE | COMMUNITY

## 2024-06-03 RX ORDER — OLMESARTAN MEDOXOMIL 40 MG/1
1 TABLET TABLET, FILM COATED ORAL ONCE A DAY
Status: ON HOLD | COMMUNITY

## 2024-06-03 RX ORDER — RIVAROXABAN 20 MG/1
1 TABLET WITH FOOD TABLET, FILM COATED ORAL ONCE A DAY
Status: ACTIVE | COMMUNITY

## 2024-06-03 RX ORDER — PREGABALIN 75 MG/1
1 CAPSULE CAPSULE ORAL TWICE A DAY
Status: ON HOLD | COMMUNITY

## 2024-06-03 RX ORDER — HUMAN INSULIN 100 [IU]/ML
30 UNITS INJECTION, SUSPENSION SUBCUTANEOUS BID
Status: ACTIVE | COMMUNITY

## 2024-06-03 RX ORDER — GLIPIZIDE 5 MG/1
1 TABLET 30 MINUTES BEFORE BREAKFAST TABLET ORAL ONCE A DAY
Status: ACTIVE | COMMUNITY

## 2024-06-03 NOTE — HPI-TODAY'S VISIT:
63-year-old male patient presents today for follow-up visit.  He is a patient of the liver center and is currently on Vemlidy due to needing to be on rituximab for RA and scleroderma treatment. He saw Liver center and US 2/15/24 did show hemangioma. MRI was ordered and has not been done yet.  He has longstanding history of reflux issues.  His most recent upper endoscopy was 2/2023 that demonstrated no abnormalities.  He is taking pantoprazole 40 mg daily and since last visit I recommended he increased this to twice a day.  He was also having issues with dysphagia, so I obtained a barium swallow which was negative.  Today he notes that his reflux is much better and his dysphagia has resolved. He did have increased sx in past 2 days and has increased his tobacco smoking. He has no odynophagia, nausea, vomiting, NSAID use. He was complaining of increase bloating, gas in the abdomen. Has gurgling as well. No postprandial fullness. I ordered a SIBO test 2x which he has not done.  He notes alternating bowel movements which he has had for some time. He states he thinks this is due to diet. He has a very poor diet consisting of mainly fast food. He says he has lost any motivation to eat healthy. He saw a dietitian in the past which didn't help. He is noting more loose BM daily. He has around 5-6 BM and can alternate from diarrhea, constipation and normal. Has increased stress in past few weeks so stool is looser. Denies hematochezia or melena.  Last colonoscopy was 6-3-2021 that demonstrated internal hemorrhoids, 2 mm tubular adenoma recommended repeating in 5 years.   He noted increased stress and depression in life lately. He would like to talk to someone about the daily stress he has and does not have much family support. He denies SI/HI. Xifaxan was sent however patient did not get medication. He was referred to [place] [facility]

## 2024-06-25 ENCOUNTER — OFFICE VISIT (OUTPATIENT)
Dept: URBAN - METROPOLITAN AREA CLINIC 92 | Facility: CLINIC | Age: 64
End: 2024-06-25

## 2024-06-25 RX ORDER — OLMESARTAN MEDOXOMIL 40 MG/1
1 TABLET TABLET, FILM COATED ORAL ONCE A DAY
Status: ON HOLD | COMMUNITY

## 2024-06-25 RX ORDER — FINASTERIDE 5 MG/1
1 TABLET TABLET, FILM COATED ORAL ONCE A DAY
Status: ACTIVE | COMMUNITY

## 2024-06-25 RX ORDER — SILDENAFIL 100 MG/1
1 TABLET AS NEEDED TABLET, FILM COATED ORAL ONCE A DAY
Status: ON HOLD | COMMUNITY

## 2024-06-25 RX ORDER — RIVAROXABAN 20 MG/1
1 TABLET WITH FOOD TABLET, FILM COATED ORAL ONCE A DAY
Status: ACTIVE | COMMUNITY

## 2024-06-25 RX ORDER — PANTOPRAZOLE SODIUM 40 MG/1
1 TABLET TABLET, DELAYED RELEASE ORAL TWICE A DAY
Qty: 180 TABLET | Refills: 0 | Status: ACTIVE | COMMUNITY

## 2024-06-25 RX ORDER — PREGABALIN 75 MG/1
1 CAPSULE CAPSULE ORAL TWICE A DAY
Status: ON HOLD | COMMUNITY

## 2024-06-25 RX ORDER — PANTOPRAZOLE SODIUM 40 MG/1
TAKE 1 TABLET BY MOUTH ONCE DAILY TABLET, DELAYED RELEASE ORAL
Qty: 30 TABLET | Refills: 10 | Status: ACTIVE | COMMUNITY

## 2024-06-25 RX ORDER — HUMAN INSULIN 100 [IU]/ML
30 UNITS INJECTION, SUSPENSION SUBCUTANEOUS BID
Status: ACTIVE | COMMUNITY

## 2024-06-25 RX ORDER — LOPERAMIDE HYDROCHLORIDE 2 MG/1
1 CAPSULE AS NEEDED CAPSULE ORAL
Status: ON HOLD | COMMUNITY

## 2024-06-25 RX ORDER — LISINOPRIL 40 MG/1
1 TABLET TABLET ORAL ONCE A DAY
Status: ON HOLD | COMMUNITY

## 2024-06-25 RX ORDER — FLUTICASONE FUROATE, UMECLIDINIUM BROMIDE AND VILANTEROL TRIFENATATE 100; 62.5; 25 UG/1; UG/1; UG/1
1 PUFF POWDER RESPIRATORY (INHALATION) ONCE A DAY
Status: ACTIVE | COMMUNITY

## 2024-06-25 RX ORDER — HYDROCODONE BITARTRATE AND ACETAMINOPHEN 5; 325 MG/1; MG/1
1 TABLET AS NEEDED TABLET ORAL
Status: ACTIVE | COMMUNITY

## 2024-06-25 RX ORDER — GLIMEPIRIDE 4 MG/1
1 TABLET WITH BREAKFAST OR THE FIRST MAIN MEAL OF THE DAY TABLET ORAL ONCE A DAY
Status: ON HOLD | COMMUNITY

## 2024-06-25 RX ORDER — TAMSULOSIN HYDROCHLORIDE 0.4 MG/1
1 CAPSULE CAPSULE ORAL ONCE A DAY
Status: ACTIVE | COMMUNITY

## 2024-06-25 RX ORDER — METOPROLOL TARTRATE 100 MG/1
1 TABLET WITH FOOD TABLET, FILM COATED ORAL TWICE A DAY
Status: ACTIVE | COMMUNITY

## 2024-06-25 RX ORDER — TENOFOVIR ALAFENAMIDE 25 MG/1
TAKE 1 TABLET BY MOUTH ONCE DAILY WITH FOOD TABLET ORAL
Qty: 90 | Refills: 0 | Status: ACTIVE | COMMUNITY

## 2024-06-25 RX ORDER — GLIPIZIDE 5 MG/1
1 TABLET 30 MINUTES BEFORE BREAKFAST TABLET ORAL ONCE A DAY
Status: ACTIVE | COMMUNITY

## 2024-06-25 NOTE — HPI-TODAY'S VISIT:
63-year-old male patient presents today for follow-up visit.  He is a patient of the liver center and is currently on Vemlidy due to needing to be on rituximab for RA and scleroderma treatment. He saw Liver center and US 2/15/24 did show hemangioma. MRI was ordered and has not been done yet.  He has longstanding history of reflux issues.  His most recent upper endoscopy was 2/2023 that demonstrated no abnormalities.  He is taking pantoprazole 40 mg daily and since last visit I recommended he increased this to twice a day.  He was also having issues with dysphagia, so I obtained a barium swallow which was negative.  Today he notes that his reflux is much better and his dysphagia has resolved. He did have increased sx in past 2 days and has increased his tobacco smoking. He has no odynophagia, nausea, vomiting, NSAID use. He was complaining of increase bloating, gas in the abdomen. Has gurgling as well. No postprandial fullness. I ordered a SIBO test 2x which he has not done.  He notes alternating bowel movements which he has had for some time. He states he thinks this is due to diet. He has a very poor diet consisting of mainly fast food. He says he has lost any motivation to eat healthy. He saw a dietitian in the past which didn't help. He is noting more loose BM daily. He has around 5-6 BM and can alternate from diarrhea, constipation and normal. Has increased stress in past few weeks so stool is looser. Denies hematochezia or melena.  Last colonoscopy was 6-3-2021 that demonstrated internal hemorrhoids, 2 mm tubular adenoma recommended repeating in 5 years.   He noted increased stress and depression in life lately. He would like to talk to someone about the daily stress he has and does not have much family support. He denies SI/HI. Xifaxan was sent however patient did not get medication. He was referred to [place] [facility]

## 2024-07-02 ENCOUNTER — OFFICE VISIT (OUTPATIENT)
Dept: URBAN - METROPOLITAN AREA CLINIC 92 | Facility: CLINIC | Age: 64
End: 2024-07-02

## 2024-07-02 RX ORDER — LISINOPRIL 40 MG/1
1 TABLET TABLET ORAL ONCE A DAY
Status: ON HOLD | COMMUNITY

## 2024-07-02 RX ORDER — GLIMEPIRIDE 4 MG/1
1 TABLET WITH BREAKFAST OR THE FIRST MAIN MEAL OF THE DAY TABLET ORAL ONCE A DAY
Status: ON HOLD | COMMUNITY

## 2024-07-02 RX ORDER — GLIPIZIDE 5 MG/1
1 TABLET 30 MINUTES BEFORE BREAKFAST TABLET ORAL ONCE A DAY
Status: ACTIVE | COMMUNITY

## 2024-07-02 RX ORDER — TAMSULOSIN HYDROCHLORIDE 0.4 MG/1
1 CAPSULE CAPSULE ORAL ONCE A DAY
Status: ACTIVE | COMMUNITY

## 2024-07-02 RX ORDER — SILDENAFIL 100 MG/1
1 TABLET AS NEEDED TABLET, FILM COATED ORAL ONCE A DAY
Status: ON HOLD | COMMUNITY

## 2024-07-02 RX ORDER — FLUTICASONE FUROATE, UMECLIDINIUM BROMIDE AND VILANTEROL TRIFENATATE 100; 62.5; 25 UG/1; UG/1; UG/1
1 PUFF POWDER RESPIRATORY (INHALATION) ONCE A DAY
Status: ACTIVE | COMMUNITY

## 2024-07-02 RX ORDER — PREGABALIN 75 MG/1
1 CAPSULE CAPSULE ORAL TWICE A DAY
Status: ON HOLD | COMMUNITY

## 2024-07-02 RX ORDER — METOPROLOL TARTRATE 100 MG/1
1 TABLET WITH FOOD TABLET, FILM COATED ORAL TWICE A DAY
Status: ACTIVE | COMMUNITY

## 2024-07-02 RX ORDER — RIVAROXABAN 20 MG/1
1 TABLET WITH FOOD TABLET, FILM COATED ORAL ONCE A DAY
Status: ACTIVE | COMMUNITY

## 2024-07-02 RX ORDER — FINASTERIDE 5 MG/1
1 TABLET TABLET, FILM COATED ORAL ONCE A DAY
Status: ACTIVE | COMMUNITY

## 2024-07-02 RX ORDER — TENOFOVIR ALAFENAMIDE 25 MG/1
TAKE 1 TABLET BY MOUTH ONCE DAILY WITH FOOD TABLET ORAL
Qty: 90 | Refills: 0 | Status: ACTIVE | COMMUNITY

## 2024-07-02 RX ORDER — LOPERAMIDE HYDROCHLORIDE 2 MG/1
1 CAPSULE AS NEEDED CAPSULE ORAL
Status: ON HOLD | COMMUNITY

## 2024-07-02 RX ORDER — HUMAN INSULIN 100 [IU]/ML
30 UNITS INJECTION, SUSPENSION SUBCUTANEOUS BID
Status: ACTIVE | COMMUNITY

## 2024-07-02 RX ORDER — PANTOPRAZOLE SODIUM 40 MG/1
1 TABLET TABLET, DELAYED RELEASE ORAL TWICE A DAY
Qty: 180 TABLET | Refills: 0 | Status: ACTIVE | COMMUNITY

## 2024-07-02 RX ORDER — OLMESARTAN MEDOXOMIL 40 MG/1
1 TABLET TABLET, FILM COATED ORAL ONCE A DAY
Status: ON HOLD | COMMUNITY

## 2024-07-02 RX ORDER — PANTOPRAZOLE SODIUM 40 MG/1
1 TABLET TABLET, DELAYED RELEASE ORAL TWICE A DAY
Qty: 180 TABLET | Refills: 0 | OUTPATIENT

## 2024-07-02 RX ORDER — HYDROCODONE BITARTRATE AND ACETAMINOPHEN 5; 325 MG/1; MG/1
1 TABLET AS NEEDED TABLET ORAL
Status: ACTIVE | COMMUNITY

## 2024-07-02 RX ORDER — PANTOPRAZOLE SODIUM 40 MG/1
TAKE 1 TABLET BY MOUTH ONCE DAILY TABLET, DELAYED RELEASE ORAL
Qty: 30 TABLET | Refills: 10 | Status: ACTIVE | COMMUNITY

## 2024-07-02 NOTE — HPI-TODAY'S VISIT:
63-year-old male patient presents today for follow-up visit.  He is a patient of the liver center and is currently on Vemlidy due to needing to be on rituximab for RA and scleroderma treatment. He saw Liver center and US 2/15/24 did show hemangioma. MRI was ordered and has not been done yet.  He has longstanding history of reflux issues.  His most recent upper endoscopy was 2/2023 that demonstrated no abnormalities.  He is taking pantoprazole 40 mg daily and since last visit I recommended he increased this to twice a day.  He was also having issues with dysphagia, so I obtained a barium swallow which was negative.  Today he notes that his reflux is much better and his dysphagia has resolved. He did have increased sx in past 2 days and has increased his tobacco smoking. He has no odynophagia, nausea, vomiting, NSAID use. He was complaining of increase bloating, gas in the abdomen. Has gurgling as well. No postprandial fullness. I ordered a SIBO test 2x which he has not done.  He notes alternating bowel movements which he has had for some time. He states he thinks this is due to diet. He has a very poor diet consisting of mainly fast food. He says he has lost any motivation to eat healthy. He saw a dietitian in the past which didn't help. He is noting more loose BM daily. He has around 5-6 BM and can alternate from diarrhea, constipation and normal. Has increased stress in past few weeks so stool is looser. Denies hematochezia or melena.  Last colonoscopy was 6-3-2021 that demonstrated internal hemorrhoids, 2 mm tubular adenoma recommended repeating in 5 years.   He noted increased stress and depression in life lately. He would like to talk to someone about the daily stress he has and does not have much family support. He denies SI/HI. Xifaxan was sent however patient did not get medication. He was referred to [place] [facility]

## 2024-07-05 ENCOUNTER — OFFICE VISIT (OUTPATIENT)
Dept: URBAN - METROPOLITAN AREA CLINIC 109 | Facility: CLINIC | Age: 64
End: 2024-07-05
Payer: COMMERCIAL

## 2024-07-05 VITALS
DIASTOLIC BLOOD PRESSURE: 66 MMHG | SYSTOLIC BLOOD PRESSURE: 110 MMHG | TEMPERATURE: 97.5 F | HEIGHT: 68 IN | HEART RATE: 74 BPM | WEIGHT: 178 LBS | BODY MASS INDEX: 26.98 KG/M2

## 2024-07-05 DIAGNOSIS — K21.9 GASTROESOPHAGEAL REFLUX DISEASE WITHOUT ESOPHAGITIS: ICD-10-CM

## 2024-07-05 DIAGNOSIS — R14.0 ABDOMINAL BLOATING: ICD-10-CM

## 2024-07-05 DIAGNOSIS — K64.8 INTERNAL HEMORRHOID: ICD-10-CM

## 2024-07-05 DIAGNOSIS — R13.19 ESOPHAGEAL DYSPHAGIA: ICD-10-CM

## 2024-07-05 DIAGNOSIS — K58.0 IRRITABLE BOWEL SYNDROME WITH DIARRHEA: ICD-10-CM

## 2024-07-05 DIAGNOSIS — F32.A CHRONIC DEPRESSION: ICD-10-CM

## 2024-07-05 PROCEDURE — 99214 OFFICE O/P EST MOD 30 MIN: CPT | Performed by: INTERNAL MEDICINE

## 2024-07-05 RX ORDER — METOPROLOL TARTRATE 100 MG/1
1 TABLET WITH FOOD TABLET, FILM COATED ORAL TWICE A DAY
Status: ACTIVE | COMMUNITY

## 2024-07-05 RX ORDER — HYDROCODONE BITARTRATE AND ACETAMINOPHEN 5; 325 MG/1; MG/1
1 TABLET AS NEEDED TABLET ORAL
Status: ACTIVE | COMMUNITY

## 2024-07-05 RX ORDER — PREGABALIN 75 MG/1
1 CAPSULE CAPSULE ORAL TWICE A DAY
Status: ON HOLD | COMMUNITY

## 2024-07-05 RX ORDER — PANTOPRAZOLE SODIUM 40 MG/1
1 TABLET TABLET, DELAYED RELEASE ORAL TWICE A DAY
Qty: 180 TABLET | Refills: 0 | Status: ACTIVE | COMMUNITY

## 2024-07-05 RX ORDER — OLMESARTAN MEDOXOMIL 40 MG/1
1 TABLET TABLET, FILM COATED ORAL ONCE A DAY
Status: ON HOLD | COMMUNITY

## 2024-07-05 RX ORDER — PANTOPRAZOLE SODIUM 40 MG/1
1 TABLET TABLET, DELAYED RELEASE ORAL ONCE A DAY
Qty: 90 TABLET | Refills: 3 | OUTPATIENT

## 2024-07-05 RX ORDER — FLUTICASONE FUROATE, UMECLIDINIUM BROMIDE AND VILANTEROL TRIFENATATE 100; 62.5; 25 UG/1; UG/1; UG/1
1 PUFF POWDER RESPIRATORY (INHALATION) ONCE A DAY
Status: ACTIVE | COMMUNITY

## 2024-07-05 RX ORDER — GLIPIZIDE 5 MG/1
1 TABLET 30 MINUTES BEFORE BREAKFAST TABLET ORAL ONCE A DAY
Status: ACTIVE | COMMUNITY

## 2024-07-05 RX ORDER — FINASTERIDE 5 MG/1
1 TABLET TABLET, FILM COATED ORAL ONCE A DAY
Status: ACTIVE | COMMUNITY

## 2024-07-05 RX ORDER — RIVAROXABAN 20 MG/1
1 TABLET WITH FOOD TABLET, FILM COATED ORAL ONCE A DAY
Status: ACTIVE | COMMUNITY

## 2024-07-05 RX ORDER — HUMAN INSULIN 100 [IU]/ML
30 UNITS INJECTION, SUSPENSION SUBCUTANEOUS BID
Status: ACTIVE | COMMUNITY

## 2024-07-05 RX ORDER — TAMSULOSIN HYDROCHLORIDE 0.4 MG/1
1 CAPSULE CAPSULE ORAL ONCE A DAY
Status: ACTIVE | COMMUNITY

## 2024-07-05 RX ORDER — LISINOPRIL 40 MG/1
1 TABLET TABLET ORAL ONCE A DAY
Status: ON HOLD | COMMUNITY

## 2024-07-05 RX ORDER — TENOFOVIR ALAFENAMIDE 25 MG/1
TAKE 1 TABLET BY MOUTH ONCE DAILY WITH FOOD TABLET ORAL
Qty: 90 | Refills: 0 | Status: ACTIVE | COMMUNITY

## 2024-07-05 RX ORDER — GLIMEPIRIDE 4 MG/1
1 TABLET WITH BREAKFAST OR THE FIRST MAIN MEAL OF THE DAY TABLET ORAL ONCE A DAY
Status: ON HOLD | COMMUNITY

## 2024-07-05 RX ORDER — SILDENAFIL 100 MG/1
1 TABLET AS NEEDED TABLET, FILM COATED ORAL ONCE A DAY
Status: ON HOLD | COMMUNITY

## 2024-07-05 RX ORDER — LOPERAMIDE HYDROCHLORIDE 2 MG/1
1 CAPSULE AS NEEDED CAPSULE ORAL
Status: ON HOLD | COMMUNITY

## 2024-07-05 NOTE — HPI-TODAY'S VISIT:
7/5/24 this is my first time meeting the patient I reviewed the chart results documented below complains of abd bloating and discomfort neg EGD 2023 he thinks he did take the xifaxan (t jelly bean indicated they had trouble getting through to him), it helped and then the syptoms came back now with heartburn as well last night (which was the 4th of july and he went to a BBQ)  4/2024 [facility] 63-year-old male patient presents today for follow-up visit.  He is a patient of the liver center and is currently on Vemlidy due to needing to be on rituximab for RA and scleroderma treatment. He saw [facility] and US 2/15/24 did show hemangioma. MRI was ordered and has not been done yet.  He has longstanding history of reflux issues.  His most recent upper endoscopy was 2/2023 that demonstrated no abnormalities.  He is taking pantoprazole 40 mg daily and since last visit I recommended he increased this to twice a day.  He was also having issues with dysphagia, so I obtained a barium swallow which was negative.  Today he notes that his reflux is much better and his dysphagia has resolved. He did have increased sx in past 2 days and has increased his tobacco smoking. He has no odynophagia, nausea, vomiting, NSAID use. He was complaining of increase bloating, gas in the abdomen. Has gurgling as well. No postprandial fullness. I ordered a SIBO test 2x which he has not done.  He notes alternating bowel movements which he has had for some time. He states he thinks this is due to diet. He has a very poor diet consisting of mainly fast food. He says he has lost any motivation to eat healthy. He saw a dietitian in the past which didn't help. He is noting more loose BM daily. He has around 5-6 BM and can alternate from diarrhea, constipation and normal. Has increased stress in past few weeks so stool is looser. Denies hematochezia or melena.  Last colonoscopy was 6-3-2021 that demonstrated internal hemorrhoids, 2 mm tubular adenoma recommended repeating in 5 years.   He noted increased stress and depression in life lately. He would like to talk to someone about the daily stress he has and does not have much family support. He denies SI/HI. Xifaxan was sent however patient did not get medication. He was referred to [place] [facility]

## 2024-08-01 ENCOUNTER — OFFICE VISIT (OUTPATIENT)
Dept: URBAN - METROPOLITAN AREA CLINIC 92 | Facility: CLINIC | Age: 64
End: 2024-08-01

## 2024-08-01 RX ORDER — LISINOPRIL 40 MG/1
1 TABLET TABLET ORAL ONCE A DAY
Status: ON HOLD | COMMUNITY

## 2024-08-01 RX ORDER — FINASTERIDE 5 MG/1
1 TABLET TABLET, FILM COATED ORAL ONCE A DAY
Status: ACTIVE | COMMUNITY

## 2024-08-01 RX ORDER — PREGABALIN 75 MG/1
1 CAPSULE CAPSULE ORAL TWICE A DAY
Status: ON HOLD | COMMUNITY

## 2024-08-01 RX ORDER — FLUTICASONE FUROATE, UMECLIDINIUM BROMIDE AND VILANTEROL TRIFENATATE 100; 62.5; 25 UG/1; UG/1; UG/1
1 PUFF POWDER RESPIRATORY (INHALATION) ONCE A DAY
Status: ACTIVE | COMMUNITY

## 2024-08-01 RX ORDER — LOPERAMIDE HYDROCHLORIDE 2 MG/1
1 CAPSULE AS NEEDED CAPSULE ORAL
Status: ON HOLD | COMMUNITY

## 2024-08-01 RX ORDER — HYDROCODONE BITARTRATE AND ACETAMINOPHEN 5; 325 MG/1; MG/1
1 TABLET AS NEEDED TABLET ORAL
Status: ACTIVE | COMMUNITY

## 2024-08-01 RX ORDER — METOPROLOL TARTRATE 100 MG/1
1 TABLET WITH FOOD TABLET, FILM COATED ORAL TWICE A DAY
Status: ACTIVE | COMMUNITY

## 2024-08-01 RX ORDER — TAMSULOSIN HYDROCHLORIDE 0.4 MG/1
1 CAPSULE CAPSULE ORAL ONCE A DAY
Status: ACTIVE | COMMUNITY

## 2024-08-01 RX ORDER — OLMESARTAN MEDOXOMIL 40 MG/1
1 TABLET TABLET, FILM COATED ORAL ONCE A DAY
Status: ON HOLD | COMMUNITY

## 2024-08-01 RX ORDER — TENOFOVIR ALAFENAMIDE 25 MG/1
TAKE 1 TABLET BY MOUTH ONCE DAILY WITH FOOD TABLET ORAL
Qty: 90 | Refills: 0 | Status: ACTIVE | COMMUNITY

## 2024-08-01 RX ORDER — PANTOPRAZOLE SODIUM 40 MG/1
1 TABLET TABLET, DELAYED RELEASE ORAL ONCE A DAY
Qty: 90 TABLET | Refills: 3 | Status: ACTIVE | COMMUNITY

## 2024-08-01 RX ORDER — GLIMEPIRIDE 4 MG/1
1 TABLET WITH BREAKFAST OR THE FIRST MAIN MEAL OF THE DAY TABLET ORAL ONCE A DAY
Status: ON HOLD | COMMUNITY

## 2024-08-01 RX ORDER — HUMAN INSULIN 100 [IU]/ML
30 UNITS INJECTION, SUSPENSION SUBCUTANEOUS BID
Status: ACTIVE | COMMUNITY

## 2024-08-01 RX ORDER — GLIPIZIDE 5 MG/1
1 TABLET 30 MINUTES BEFORE BREAKFAST TABLET ORAL ONCE A DAY
Status: ACTIVE | COMMUNITY

## 2024-08-01 RX ORDER — SILDENAFIL 100 MG/1
1 TABLET AS NEEDED TABLET, FILM COATED ORAL ONCE A DAY
Status: ON HOLD | COMMUNITY

## 2024-08-01 RX ORDER — RIVAROXABAN 20 MG/1
1 TABLET WITH FOOD TABLET, FILM COATED ORAL ONCE A DAY
Status: ACTIVE | COMMUNITY

## 2024-08-06 ENCOUNTER — OFFICE VISIT (OUTPATIENT)
Dept: URBAN - METROPOLITAN AREA CLINIC 91 | Facility: CLINIC | Age: 64
End: 2024-08-06

## 2024-08-15 ENCOUNTER — LAB OUTSIDE AN ENCOUNTER (OUTPATIENT)
Dept: URBAN - METROPOLITAN AREA CLINIC 86 | Facility: CLINIC | Age: 64
End: 2024-08-15

## 2024-08-19 ENCOUNTER — ERX REFILL RESPONSE (OUTPATIENT)
Dept: URBAN - METROPOLITAN AREA CLINIC 86 | Facility: CLINIC | Age: 64
End: 2024-08-19

## 2024-08-19 RX ORDER — TENOFOVIR ALAFENAMIDE 25 MG/1
TAKE 1 TABLET BY MOUTH ONCE DAILY WITH FOOD TABLET ORAL
Qty: 90 | Refills: 0 | OUTPATIENT

## 2024-09-04 ENCOUNTER — LAB OUTSIDE AN ENCOUNTER (OUTPATIENT)
Dept: URBAN - METROPOLITAN AREA CLINIC 92 | Facility: CLINIC | Age: 64
End: 2024-09-04

## 2024-09-04 ENCOUNTER — OFFICE VISIT (OUTPATIENT)
Dept: URBAN - METROPOLITAN AREA CLINIC 92 | Facility: CLINIC | Age: 64
End: 2024-09-04
Payer: COMMERCIAL

## 2024-09-04 VITALS
DIASTOLIC BLOOD PRESSURE: 80 MMHG | HEART RATE: 65 BPM | HEIGHT: 68 IN | BODY MASS INDEX: 26.83 KG/M2 | WEIGHT: 177 LBS | TEMPERATURE: 97.1 F | SYSTOLIC BLOOD PRESSURE: 120 MMHG

## 2024-09-04 DIAGNOSIS — K92.1 MELENA: ICD-10-CM

## 2024-09-04 DIAGNOSIS — R19.7 DIARRHEA, UNSPECIFIED TYPE: ICD-10-CM

## 2024-09-04 PROCEDURE — 99214 OFFICE O/P EST MOD 30 MIN: CPT | Performed by: INTERNAL MEDICINE

## 2024-09-04 RX ORDER — PANTOPRAZOLE SODIUM 40 MG/1
1 TABLET TABLET, DELAYED RELEASE ORAL ONCE A DAY
Qty: 90 TABLET | Refills: 3 | Status: ACTIVE | COMMUNITY

## 2024-09-04 RX ORDER — HYDROCODONE BITARTRATE AND ACETAMINOPHEN 5; 325 MG/1; MG/1
1 TABLET AS NEEDED TABLET ORAL
Status: ACTIVE | COMMUNITY

## 2024-09-04 RX ORDER — TAMSULOSIN HYDROCHLORIDE 0.4 MG/1
1 CAPSULE CAPSULE ORAL ONCE A DAY
Status: ACTIVE | COMMUNITY

## 2024-09-04 RX ORDER — SILDENAFIL 100 MG/1
1 TABLET AS NEEDED TABLET, FILM COATED ORAL ONCE A DAY
Status: ON HOLD | COMMUNITY

## 2024-09-04 RX ORDER — FLUTICASONE FUROATE, UMECLIDINIUM BROMIDE AND VILANTEROL TRIFENATATE 100; 62.5; 25 UG/1; UG/1; UG/1
1 PUFF POWDER RESPIRATORY (INHALATION) ONCE A DAY
Status: ACTIVE | COMMUNITY

## 2024-09-04 RX ORDER — PREGABALIN 75 MG/1
1 CAPSULE CAPSULE ORAL TWICE A DAY
Status: ON HOLD | COMMUNITY

## 2024-09-04 RX ORDER — LOPERAMIDE HYDROCHLORIDE 2 MG/1
1 CAPSULE AS NEEDED CAPSULE ORAL
Status: ON HOLD | COMMUNITY

## 2024-09-04 RX ORDER — TENOFOVIR ALAFENAMIDE 25 MG/1
TAKE 1 TABLET BY MOUTH ONCE DAILY WITH FOOD TABLET ORAL
Qty: 90 | Refills: 0 | Status: ACTIVE | COMMUNITY

## 2024-09-04 RX ORDER — METOPROLOL TARTRATE 100 MG/1
1 TABLET WITH FOOD TABLET, FILM COATED ORAL TWICE A DAY
Status: ACTIVE | COMMUNITY

## 2024-09-04 RX ORDER — DICYCLOMINE HYDROCHLORIDE 10 MG/1
1-2 TABLETS CAPSULE ORAL
Qty: 90 | Refills: 0 | OUTPATIENT
Start: 2024-09-04 | End: 2024-10-04

## 2024-09-04 RX ORDER — RIVAROXABAN 20 MG/1
1 TABLET WITH FOOD TABLET, FILM COATED ORAL ONCE A DAY
Status: ACTIVE | COMMUNITY

## 2024-09-04 RX ORDER — FINASTERIDE 5 MG/1
1 TABLET TABLET, FILM COATED ORAL ONCE A DAY
Status: ACTIVE | COMMUNITY

## 2024-09-04 RX ORDER — GLIMEPIRIDE 4 MG/1
1 TABLET WITH BREAKFAST OR THE FIRST MAIN MEAL OF THE DAY TABLET ORAL ONCE A DAY
Status: ON HOLD | COMMUNITY

## 2024-09-04 RX ORDER — LISINOPRIL 40 MG/1
1 TABLET TABLET ORAL ONCE A DAY
Status: ON HOLD | COMMUNITY

## 2024-09-04 RX ORDER — GLIPIZIDE 5 MG/1
1 TABLET 30 MINUTES BEFORE BREAKFAST TABLET ORAL ONCE A DAY
Status: ACTIVE | COMMUNITY

## 2024-09-04 RX ORDER — HUMAN INSULIN 100 [IU]/ML
30 UNITS INJECTION, SUSPENSION SUBCUTANEOUS BID
Status: ACTIVE | COMMUNITY

## 2024-09-04 RX ORDER — OLMESARTAN MEDOXOMIL 40 MG/1
1 TABLET TABLET, FILM COATED ORAL ONCE A DAY
Status: ON HOLD | COMMUNITY

## 2024-09-04 NOTE — HPI-TODAY'S VISIT:
7/5/24 this is my first time meeting the patient I reviewed the chart results documented below complains of abd bloating and discomfort neg EGD 2023 he thinks he did take the xifaxan (t jelly bean indicated they had trouble getting through to him), it helped and then the syptoms came back now with heartburn as well last night (which was the 4th of july and he went to a BBQ)  4/2024 [facility] 63-year-old male patient presents today for follow-up visit.  He is a patient of the liver center and is currently on Vemlidy due to needing to be on rituximab for RA and scleroderma treatment. He saw [facility] and US 2/15/24 did show hemangioma. MRI was ordered and has not been done yet.  He has longstanding history of reflux issues.  His most recent upper endoscopy was 2/2023 that demonstrated no abnormalities.  He is taking pantoprazole 40 mg daily and since last visit I recommended he increased this to twice a day.  He was also having issues with dysphagia, so I obtained a barium swallow which was negative.  Today he notes that his reflux is much better and his dysphagia has resolved. He did have increased sx in past 2 days and has increased his tobacco smoking. He has no odynophagia, nausea, vomiting, NSAID use. He was complaining of increase bloating, gas in the abdomen. Has gurgling as well. No postprandial fullness. I ordered a SIBO test 2x which he has not done.  He notes alternating bowel movements which he has had for some time. He states he thinks this is due to diet. He has a very poor diet consisting of mainly fast food. He says he has lost any motivation to eat healthy. He saw a dietitian in the past which didn't help. He is noting more loose BM daily. He has around 5-6 BM and can alternate from diarrhea, constipation and normal. Has increased stress in past few weeks so stool is looser. Denies hematochezia or melena.  Last colonoscopy was 6-3-2021 that demonstrated internal hemorrhoids, 2 mm tubular adenoma recommended repeating in 5 years.   He noted increased stress and depression in life lately. He would like to talk to someone about the daily stress he has and does not have much family support. He denies SI/HI. Xifaxan was sent however patient did not get medication. He was referred to [place] [facility]  ***9/2024: Notes jet black stool over the past few days, also fatigue. He started taking shilajet supplements about a week ago. Having diarrhea, "every 5 minutes," at least 10 times yesterday. He is on Xeralto but stopped it b/c he feels "too weak" for a couple of days. Took Pepto yesterday, but stool was black before this. Denies NSAIDs aside aspirin.

## 2024-09-05 LAB
A/G RATIO: 1.8
ABSOLUTE BASOPHILS: 11
ABSOLUTE EOSINOPHILS: 30
ABSOLUTE LYMPHOCYTES: 585
ABSOLUTE MONOCYTES: 544
ABSOLUTE NEUTROPHILS: 2531
ALBUMIN: 4.5
ALKALINE PHOSPHATASE: 76
ALT (SGPT): 23
AST (SGOT): 21
BASOPHILS: 0.3
BILIRUBIN, TOTAL: 0.9
BUN/CREATININE RATIO: (no result)
BUN: 17
C-REACTIVE PROTEIN, QUANT: 38.8
CALCIUM: 9.3
CARBON DIOXIDE, TOTAL: 22
CHLORIDE: 102
CREATININE: 1.14
EGFR: 71
EOSINOPHILS: 0.8
GLOBULIN, TOTAL: 2.5
GLUCOSE: 160
HEMATOCRIT: 45.6
HEMOGLOBIN: 15.6
IMMUNOGLOBULIN A: 195
INTERPRETATION: (no result)
LYMPHOCYTES: 15.8
MCH: 32.7
MCHC: 34.2
MCV: 95.6
MONOCYTES: 14.7
MPV: 11.9
NEUTROPHILS: 68.4
PLATELET COUNT: 171
POTASSIUM: 4
PROTEIN, TOTAL: 7
RDW: 12.8
RED BLOOD CELL COUNT: 4.77
SODIUM: 138
TISSUE TRANSGLUTAMINASE AB, IGA: <1
TSH: 1.23
WHITE BLOOD CELL COUNT: 3.7

## 2024-09-09 ENCOUNTER — LAB OUTSIDE AN ENCOUNTER (OUTPATIENT)
Dept: URBAN - METROPOLITAN AREA CLINIC 86 | Facility: CLINIC | Age: 64
End: 2024-09-09

## 2024-09-13 ENCOUNTER — LAB OUTSIDE AN ENCOUNTER (OUTPATIENT)
Dept: URBAN - METROPOLITAN AREA CLINIC 86 | Facility: CLINIC | Age: 64
End: 2024-09-13

## 2024-09-13 ENCOUNTER — OFFICE VISIT (OUTPATIENT)
Dept: URBAN - METROPOLITAN AREA CLINIC 86 | Facility: CLINIC | Age: 64
End: 2024-09-13
Payer: COMMERCIAL

## 2024-09-13 VITALS
TEMPERATURE: 97.1 F | SYSTOLIC BLOOD PRESSURE: 124 MMHG | DIASTOLIC BLOOD PRESSURE: 76 MMHG | HEIGHT: 68 IN | HEART RATE: 78 BPM | BODY MASS INDEX: 27.28 KG/M2 | WEIGHT: 180 LBS

## 2024-09-13 DIAGNOSIS — B18.2 CHRONIC HEPATITIS C: ICD-10-CM

## 2024-09-13 DIAGNOSIS — K76.9 LIVER LESION: ICD-10-CM

## 2024-09-13 DIAGNOSIS — R63.4 WEIGHT LOSS: ICD-10-CM

## 2024-09-13 DIAGNOSIS — R10.13 EPIGASTRIC PAIN: ICD-10-CM

## 2024-09-13 PROCEDURE — 99214 OFFICE O/P EST MOD 30 MIN: CPT | Performed by: PHYSICIAN ASSISTANT

## 2024-09-13 RX ORDER — HUMAN INSULIN 100 [IU]/ML
30 UNITS INJECTION, SUSPENSION SUBCUTANEOUS BID
Status: ACTIVE | COMMUNITY

## 2024-09-13 RX ORDER — HYDROCODONE BITARTRATE AND ACETAMINOPHEN 5; 325 MG/1; MG/1
1 TABLET AS NEEDED TABLET ORAL
Status: ACTIVE | COMMUNITY

## 2024-09-13 RX ORDER — FLUTICASONE FUROATE, UMECLIDINIUM BROMIDE AND VILANTEROL TRIFENATATE 100; 62.5; 25 UG/1; UG/1; UG/1
1 PUFF POWDER RESPIRATORY (INHALATION) ONCE A DAY
Status: ACTIVE | COMMUNITY

## 2024-09-13 RX ORDER — DICYCLOMINE HYDROCHLORIDE 10 MG/1
1-2 TABLETS CAPSULE ORAL
Qty: 90 | Refills: 0 | Status: ACTIVE | COMMUNITY
Start: 2024-09-04 | End: 2024-10-04

## 2024-09-13 RX ORDER — TENOFOVIR ALAFENAMIDE 25 MG/1
TAKE 1 TABLET BY MOUTH ONCE DAILY WITH FOOD TABLET ORAL
Qty: 90 | Refills: 0 | Status: ACTIVE | COMMUNITY

## 2024-09-13 RX ORDER — TENOFOVIR ALAFENAMIDE 25 MG/1
TAKE 1 TABLET BY MOUTH ONCE DAILY WITH FOOD TABLET ORAL
Qty: 90 | Refills: 0
End: 2024-12-12

## 2024-09-13 RX ORDER — PANTOPRAZOLE SODIUM 40 MG/1
1 TABLET TABLET, DELAYED RELEASE ORAL ONCE A DAY
Qty: 90 TABLET | Refills: 3 | Status: ACTIVE | COMMUNITY

## 2024-09-13 RX ORDER — LOPERAMIDE HYDROCHLORIDE 2 MG/1
1 CAPSULE AS NEEDED CAPSULE ORAL
Status: ON HOLD | COMMUNITY

## 2024-09-13 RX ORDER — FINASTERIDE 5 MG/1
1 TABLET TABLET, FILM COATED ORAL ONCE A DAY
Status: ACTIVE | COMMUNITY

## 2024-09-13 RX ORDER — GLIMEPIRIDE 4 MG/1
1 TABLET WITH BREAKFAST OR THE FIRST MAIN MEAL OF THE DAY TABLET ORAL ONCE A DAY
Status: ON HOLD | COMMUNITY

## 2024-09-13 RX ORDER — TAMSULOSIN HYDROCHLORIDE 0.4 MG/1
1 CAPSULE CAPSULE ORAL ONCE A DAY
Status: ACTIVE | COMMUNITY

## 2024-09-13 RX ORDER — METOPROLOL TARTRATE 100 MG/1
1 TABLET WITH FOOD TABLET, FILM COATED ORAL TWICE A DAY
Status: ACTIVE | COMMUNITY

## 2024-09-13 RX ORDER — OLMESARTAN MEDOXOMIL 40 MG/1
1 TABLET TABLET, FILM COATED ORAL ONCE A DAY
Status: ON HOLD | COMMUNITY

## 2024-09-13 RX ORDER — SILDENAFIL 100 MG/1
1 TABLET AS NEEDED TABLET, FILM COATED ORAL ONCE A DAY
Status: ON HOLD | COMMUNITY

## 2024-09-13 RX ORDER — GLIPIZIDE 5 MG/1
1 TABLET 30 MINUTES BEFORE BREAKFAST TABLET ORAL ONCE A DAY
Status: ACTIVE | COMMUNITY

## 2024-09-13 RX ORDER — RIVAROXABAN 20 MG/1
1 TABLET WITH FOOD TABLET, FILM COATED ORAL ONCE A DAY
Status: ACTIVE | COMMUNITY

## 2024-09-13 RX ORDER — LISINOPRIL 40 MG/1
1 TABLET TABLET ORAL ONCE A DAY
Status: ON HOLD | COMMUNITY

## 2024-09-13 RX ORDER — PREGABALIN 75 MG/1
1 CAPSULE CAPSULE ORAL TWICE A DAY
Status: ON HOLD | COMMUNITY
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
## 2024-09-17 LAB
A/G RATIO: 2
ABSOLUTE BASOPHILS: 19
ABSOLUTE EOSINOPHILS: 99
ABSOLUTE LYMPHOCYTES: 912
ABSOLUTE MONOCYTES: 409
ABSOLUTE NEUTROPHILS: 3262
ALBUMIN: 4.4
ALKALINE PHOSPHATASE: 90
ALT (SGPT): 27
AST (SGOT): 22
BASOPHILS: 0.4
BILIRUBIN, TOTAL: 0.3
BUN/CREATININE RATIO: (no result)
BUN: 20
CALCIUM: 9.3
CARBON DIOXIDE, TOTAL: 23
CHLORIDE: 103
CREATININE: 1.14
EGFR: 71
EOSINOPHILS: 2.1
GLOBULIN, TOTAL: 2.2
GLUCOSE: 221
HBV LOG10: NOT DETECTED
HEMATOCRIT: 40.6
HEMOGLOBIN: 14.1
HEPATITIS B VIRUS DNA: NOT DETECTED
LYMPHOCYTES: 19.4
MCH: 32.9
MCHC: 34.7
MCV: 94.9
MONOCYTES: 8.7
MPV: 11.8
NEUTROPHILS: 69.4
PLATELET COUNT: 196
POTASSIUM: 4.2
PROTEIN, TOTAL: 6.6
RDW: 12.9
RED BLOOD CELL COUNT: 4.28
SODIUM: 137
WHITE BLOOD CELL COUNT: 4.7

## 2024-09-18 ENCOUNTER — TELEPHONE ENCOUNTER (OUTPATIENT)
Dept: URBAN - METROPOLITAN AREA CLINIC 86 | Facility: CLINIC | Age: 64
End: 2024-09-18

## 2024-09-18 NOTE — HPI-TODAY'S VISIT:
Dear Selvin Devries,  The 9/13/24 labs were sent to me.  The glucose was elevated to 21, please share this with your primary care.  Really need to watch your sugars like we discussed.  Creatinine 1.14, sodium 137, potassium 4.2, bilirubin 0.3, alkaline phosphatase 90, AST 22 and ALT 27.  Goal for the AST and ALT is less than 35.  Hepatitis B remains undetected.  Your complete blood count was normal this includes the white blood cells red blood cells, hemoglobin, platelets.  We will see what the imaging shows us. Kat Garg PA-C

## 2024-09-30 ENCOUNTER — LAB OUTSIDE AN ENCOUNTER (OUTPATIENT)
Dept: URBAN - METROPOLITAN AREA CLINIC 92 | Facility: CLINIC | Age: 64
End: 2024-09-30

## 2024-09-30 ENCOUNTER — OFFICE VISIT (OUTPATIENT)
Dept: URBAN - METROPOLITAN AREA MEDICAL CENTER 12 | Facility: MEDICAL CENTER | Age: 64
End: 2024-09-30

## 2024-09-30 ENCOUNTER — TELEPHONE ENCOUNTER (OUTPATIENT)
Dept: URBAN - METROPOLITAN AREA CLINIC 92 | Facility: CLINIC | Age: 64
End: 2024-09-30

## 2024-09-30 RX ORDER — HUMAN INSULIN 100 [IU]/ML
30 UNITS INJECTION, SUSPENSION SUBCUTANEOUS BID
Status: ACTIVE | COMMUNITY

## 2024-09-30 RX ORDER — SILDENAFIL 100 MG/1
1 TABLET AS NEEDED TABLET, FILM COATED ORAL ONCE A DAY
Status: ON HOLD | COMMUNITY

## 2024-09-30 RX ORDER — DICYCLOMINE HYDROCHLORIDE 10 MG/1
1-2 TABLETS CAPSULE ORAL
Qty: 90 | Refills: 0 | Status: ACTIVE | COMMUNITY
Start: 2024-09-04 | End: 2024-10-04

## 2024-09-30 RX ORDER — PANTOPRAZOLE SODIUM 40 MG/1
1 TABLET TABLET, DELAYED RELEASE ORAL ONCE A DAY
Qty: 90 TABLET | Refills: 3 | Status: ACTIVE | COMMUNITY

## 2024-09-30 RX ORDER — RIVAROXABAN 20 MG/1
1 TABLET WITH FOOD TABLET, FILM COATED ORAL ONCE A DAY
Status: ACTIVE | COMMUNITY

## 2024-09-30 RX ORDER — TENOFOVIR ALAFENAMIDE 25 MG/1
TAKE 1 TABLET BY MOUTH ONCE DAILY WITH FOOD TABLET ORAL
Qty: 90 | Refills: 0 | Status: ACTIVE | COMMUNITY
End: 2024-12-12

## 2024-09-30 RX ORDER — HYDROCODONE BITARTRATE AND ACETAMINOPHEN 5; 325 MG/1; MG/1
1 TABLET AS NEEDED TABLET ORAL
Status: ACTIVE | COMMUNITY

## 2024-09-30 RX ORDER — LOPERAMIDE HYDROCHLORIDE 2 MG/1
1 CAPSULE AS NEEDED CAPSULE ORAL
Status: ON HOLD | COMMUNITY

## 2024-09-30 RX ORDER — METOPROLOL TARTRATE 100 MG/1
1 TABLET WITH FOOD TABLET, FILM COATED ORAL TWICE A DAY
Status: ACTIVE | COMMUNITY

## 2024-09-30 RX ORDER — GLIPIZIDE 5 MG/1
1 TABLET 30 MINUTES BEFORE BREAKFAST TABLET ORAL ONCE A DAY
Status: ACTIVE | COMMUNITY

## 2024-09-30 RX ORDER — OLMESARTAN MEDOXOMIL 40 MG/1
1 TABLET TABLET, FILM COATED ORAL ONCE A DAY
Status: ON HOLD | COMMUNITY

## 2024-09-30 RX ORDER — FINASTERIDE 5 MG/1
1 TABLET TABLET, FILM COATED ORAL ONCE A DAY
Status: ACTIVE | COMMUNITY

## 2024-09-30 RX ORDER — GLIMEPIRIDE 4 MG/1
1 TABLET WITH BREAKFAST OR THE FIRST MAIN MEAL OF THE DAY TABLET ORAL ONCE A DAY
Status: ON HOLD | COMMUNITY

## 2024-09-30 RX ORDER — TAMSULOSIN HYDROCHLORIDE 0.4 MG/1
1 CAPSULE CAPSULE ORAL ONCE A DAY
Status: ACTIVE | COMMUNITY

## 2024-09-30 RX ORDER — LISINOPRIL 40 MG/1
1 TABLET TABLET ORAL ONCE A DAY
Status: ON HOLD | COMMUNITY

## 2024-09-30 RX ORDER — PREGABALIN 75 MG/1
1 CAPSULE CAPSULE ORAL TWICE A DAY
Status: ON HOLD | COMMUNITY

## 2024-09-30 RX ORDER — FLUTICASONE FUROATE, UMECLIDINIUM BROMIDE AND VILANTEROL TRIFENATATE 100; 62.5; 25 UG/1; UG/1; UG/1
1 PUFF POWDER RESPIRATORY (INHALATION) ONCE A DAY
Status: ACTIVE | COMMUNITY

## 2024-10-21 ENCOUNTER — TELEPHONE ENCOUNTER (OUTPATIENT)
Dept: URBAN - METROPOLITAN AREA CLINIC 92 | Facility: CLINIC | Age: 64
End: 2024-10-21

## 2024-11-19 ENCOUNTER — OFFICE VISIT (OUTPATIENT)
Dept: URBAN - METROPOLITAN AREA CLINIC 86 | Facility: CLINIC | Age: 64
End: 2024-11-19

## 2024-11-19 RX ORDER — FLUTICASONE FUROATE, UMECLIDINIUM BROMIDE AND VILANTEROL TRIFENATATE 100; 62.5; 25 UG/1; UG/1; UG/1
1 PUFF POWDER RESPIRATORY (INHALATION) ONCE A DAY
Status: ACTIVE | COMMUNITY

## 2024-11-19 RX ORDER — FINASTERIDE 5 MG/1
1 TABLET TABLET, FILM COATED ORAL ONCE A DAY
Status: ACTIVE | COMMUNITY

## 2024-11-19 RX ORDER — LOPERAMIDE HYDROCHLORIDE 2 MG/1
1 CAPSULE AS NEEDED CAPSULE ORAL
Status: ON HOLD | COMMUNITY

## 2024-11-19 RX ORDER — GLIMEPIRIDE 4 MG/1
1 TABLET WITH BREAKFAST OR THE FIRST MAIN MEAL OF THE DAY TABLET ORAL ONCE A DAY
Status: ON HOLD | COMMUNITY

## 2024-11-19 RX ORDER — HYDROCODONE BITARTRATE AND ACETAMINOPHEN 5; 325 MG/1; MG/1
1 TABLET AS NEEDED TABLET ORAL
Status: ACTIVE | COMMUNITY

## 2024-11-19 RX ORDER — HUMAN INSULIN 100 [IU]/ML
30 UNITS INJECTION, SUSPENSION SUBCUTANEOUS BID
Status: ACTIVE | COMMUNITY

## 2024-11-19 RX ORDER — METOPROLOL TARTRATE 100 MG/1
1 TABLET WITH FOOD TABLET, FILM COATED ORAL TWICE A DAY
Status: ACTIVE | COMMUNITY

## 2024-11-19 RX ORDER — PREGABALIN 75 MG/1
1 CAPSULE CAPSULE ORAL TWICE A DAY
Status: ON HOLD | COMMUNITY

## 2024-11-19 RX ORDER — TAMSULOSIN HYDROCHLORIDE 0.4 MG/1
1 CAPSULE CAPSULE ORAL ONCE A DAY
Status: ACTIVE | COMMUNITY

## 2024-11-19 RX ORDER — SILDENAFIL 100 MG/1
1 TABLET AS NEEDED TABLET, FILM COATED ORAL ONCE A DAY
Status: ON HOLD | COMMUNITY

## 2024-11-19 RX ORDER — OLMESARTAN MEDOXOMIL 40 MG/1
1 TABLET TABLET, FILM COATED ORAL ONCE A DAY
Status: ON HOLD | COMMUNITY

## 2024-11-19 RX ORDER — RIVAROXABAN 20 MG/1
1 TABLET WITH FOOD TABLET, FILM COATED ORAL ONCE A DAY
Status: ACTIVE | COMMUNITY

## 2024-11-19 RX ORDER — TENOFOVIR ALAFENAMIDE 25 MG/1
TAKE 1 TABLET BY MOUTH ONCE DAILY WITH FOOD TABLET ORAL
Qty: 90 | Refills: 0

## 2024-11-19 RX ORDER — LISINOPRIL 40 MG/1
1 TABLET TABLET ORAL ONCE A DAY
Status: ON HOLD | COMMUNITY

## 2024-11-19 RX ORDER — GLIPIZIDE 5 MG/1
1 TABLET 30 MINUTES BEFORE BREAKFAST TABLET ORAL ONCE A DAY
Status: ACTIVE | COMMUNITY

## 2024-11-19 RX ORDER — PANTOPRAZOLE SODIUM 40 MG/1
1 TABLET TABLET, DELAYED RELEASE ORAL ONCE A DAY
Qty: 90 TABLET | Refills: 3 | Status: ACTIVE | COMMUNITY

## 2024-11-19 RX ORDER — TENOFOVIR ALAFENAMIDE 25 MG/1
TAKE 1 TABLET BY MOUTH ONCE DAILY WITH FOOD TABLET ORAL
Qty: 90 | Refills: 0 | Status: ACTIVE | COMMUNITY
End: 2024-12-12
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
## 2024-12-05 ENCOUNTER — OFFICE VISIT (OUTPATIENT)
Dept: URBAN - METROPOLITAN AREA CLINIC 92 | Facility: CLINIC | Age: 64
End: 2024-12-05
Payer: COMMERCIAL

## 2024-12-05 VITALS
TEMPERATURE: 97.2 F | HEART RATE: 72 BPM | WEIGHT: 180 LBS | SYSTOLIC BLOOD PRESSURE: 130 MMHG | BODY MASS INDEX: 27.28 KG/M2 | HEIGHT: 68 IN | DIASTOLIC BLOOD PRESSURE: 62 MMHG

## 2024-12-05 DIAGNOSIS — R19.7 DIARRHEA, UNSPECIFIED TYPE: ICD-10-CM

## 2024-12-05 PROCEDURE — 99213 OFFICE O/P EST LOW 20 MIN: CPT

## 2024-12-05 RX ORDER — LOPERAMIDE HYDROCHLORIDE 2 MG/1
1 CAPSULE AS NEEDED CAPSULE ORAL
Status: ON HOLD | COMMUNITY

## 2024-12-05 RX ORDER — PANTOPRAZOLE SODIUM 40 MG/1
1 TABLET TABLET, DELAYED RELEASE ORAL ONCE A DAY
Qty: 90 TABLET | Refills: 3 | Status: ACTIVE | COMMUNITY

## 2024-12-05 RX ORDER — GLIPIZIDE 5 MG/1
1 TABLET 30 MINUTES BEFORE BREAKFAST TABLET ORAL ONCE A DAY
Status: ACTIVE | COMMUNITY

## 2024-12-05 RX ORDER — CHOLESTYRAMINE 4 G/9G
1 PACKET MIXED WITH WATER OR NON-CARBONATED DRINK POWDER, FOR SUSPENSION ORAL ONCE A DAY
Qty: 30 | OUTPATIENT
Start: 2024-12-05

## 2024-12-05 RX ORDER — HYDROCODONE BITARTRATE AND ACETAMINOPHEN 5; 325 MG/1; MG/1
1 TABLET AS NEEDED TABLET ORAL
Status: ACTIVE | COMMUNITY

## 2024-12-05 RX ORDER — HUMAN INSULIN 100 [IU]/ML
30 UNITS INJECTION, SUSPENSION SUBCUTANEOUS BID
Status: ACTIVE | COMMUNITY

## 2024-12-05 RX ORDER — LISINOPRIL 40 MG/1
1 TABLET TABLET ORAL ONCE A DAY
Status: ON HOLD | COMMUNITY

## 2024-12-05 RX ORDER — TENOFOVIR ALAFENAMIDE 25 MG/1
TAKE 1 TABLET BY MOUTH ONCE DAILY WITH FOOD TABLET ORAL
Qty: 90 | Refills: 0 | Status: ACTIVE | COMMUNITY

## 2024-12-05 RX ORDER — FINASTERIDE 5 MG/1
1 TABLET TABLET, FILM COATED ORAL ONCE A DAY
Status: ACTIVE | COMMUNITY

## 2024-12-05 RX ORDER — TAMSULOSIN HYDROCHLORIDE 0.4 MG/1
1 CAPSULE CAPSULE ORAL ONCE A DAY
Status: ACTIVE | COMMUNITY

## 2024-12-05 RX ORDER — FLUTICASONE FUROATE, UMECLIDINIUM BROMIDE AND VILANTEROL TRIFENATATE 100; 62.5; 25 UG/1; UG/1; UG/1
1 PUFF POWDER RESPIRATORY (INHALATION) ONCE A DAY
Status: ACTIVE | COMMUNITY

## 2024-12-05 RX ORDER — GLIMEPIRIDE 4 MG/1
1 TABLET WITH BREAKFAST OR THE FIRST MAIN MEAL OF THE DAY TABLET ORAL ONCE A DAY
Status: ON HOLD | COMMUNITY

## 2024-12-05 RX ORDER — SILDENAFIL 100 MG/1
1 TABLET AS NEEDED TABLET, FILM COATED ORAL ONCE A DAY
Status: ON HOLD | COMMUNITY

## 2024-12-05 RX ORDER — METOPROLOL TARTRATE 100 MG/1
1 TABLET WITH FOOD TABLET, FILM COATED ORAL TWICE A DAY
Status: ACTIVE | COMMUNITY

## 2024-12-05 RX ORDER — RIVAROXABAN 20 MG/1
1 TABLET WITH FOOD TABLET, FILM COATED ORAL ONCE A DAY
Status: ACTIVE | COMMUNITY

## 2024-12-05 RX ORDER — OLMESARTAN MEDOXOMIL 40 MG/1
1 TABLET TABLET, FILM COATED ORAL ONCE A DAY
Status: ON HOLD | COMMUNITY

## 2024-12-05 RX ORDER — PREGABALIN 75 MG/1
1 CAPSULE CAPSULE ORAL TWICE A DAY
Status: ON HOLD | COMMUNITY

## 2024-12-05 NOTE — HPI-TODAY'S VISIT:
4/2024 63-year-old male patient presents today for follow-up visit.  He is a patient of the liver center and is currently on Vemlidy due to needing to be on rituximab for RA and scleroderma treatment. He saw Liver center and US 2/15/24 did show hemangioma. MRI was ordered and has not been done yet.  He has longstanding history of reflux issues.  His most recent upper endoscopy was 2/2023 that demonstrated no abnormalities.  He is taking pantoprazole 40 mg daily and since last visit I recommended he increased this to twice a day.  He was also having issues with dysphagia, so I obtained a barium swallow which was negative.  Today he notes that his reflux is much better and his dysphagia has resolved. He did have increased sx in past 2 days and has increased his tobacco smoking. He has no odynophagia, nausea, vomiting, NSAID use. He was complaining of increase bloating, gas in the abdomen. Has gurgling as well. No postprandial fullness. I ordered a SIBO test 2x which he has not done.  He notes alternating bowel movements which he has had for some time. He states he thinks this is due to diet. He has a very poor diet consisting of mainly fast food. He says he has lost any motivation to eat healthy. He saw a dietitian in the past which didn't help. He is noting more loose BM daily. He has around 5-6 BM and can alternate from diarrhea, constipation and normal. Has increased stress in past few weeks so stool is looser. Denies hematochezia or melena.  Last colonoscopy was 6-3-2021 that demonstrated internal hemorrhoids, 2 mm tubular adenoma recommended repeating in 5 years.   He noted increased stress and depression in life lately. He would like to talk to someone about the daily stress he has and does not have much family support. He denies SI/HI.  12/5/24 I last saw patient in April 2024. He followed up with Dr. Stack at the [place] office in July 2024. During this time was complaining of bloating and abdominal discomfort. At this time it was unknown if patient was taking PPI or notso he was told to take pantoprazole daily. He then followed up with Dr. Holden on 9-4-2024 and was complaining of melena, fatigue, diarrhea. At that time he noted he stopped taking Xarelto because he felt too weak. During this time stool studies were obtained that showed no abnormalities, hemoglobin was 15.6, hematocrit 45.6, glucose 160 otherwise normal CMP, CRP 38.8. He was told to start Bentyl and Imodium as needed. He had an EGD on 9- which demonstrated no abnormalities biopsies were positive for H. pylori no treatment has been sent for this. He had a CT on 10- which demonstrated no acute abnormalities, sclerosis involving pedicles at L5 favored to be degenerative to suggest correlation with PSA to better exclude concerning prostate lesion Today he notes he continues to have diarrheaand it is getting worse. Has 7-8 loose BM daily. Sx worse after eating so he is eating less. No nocturnal stool. No BRBPR or melena. Has some abd cramping better after BM. He did not take Imodium as suggested at last visit. He has been taking dicyclomine but he thought this was for his bm.

## 2025-02-05 ENCOUNTER — OFFICE VISIT (OUTPATIENT)
Dept: URBAN - METROPOLITAN AREA MEDICAL CENTER 12 | Facility: MEDICAL CENTER | Age: 65
End: 2025-02-05
Payer: MEDICARE

## 2025-02-05 DIAGNOSIS — D12.0 ADENOMA OF CECUM: ICD-10-CM

## 2025-02-05 DIAGNOSIS — R19.7 ACUTE DIARRHEA: ICD-10-CM

## 2025-02-05 PROCEDURE — 45380 COLONOSCOPY AND BIOPSY: CPT | Performed by: INTERNAL MEDICINE

## 2025-02-26 ENCOUNTER — TELEPHONE ENCOUNTER (OUTPATIENT)
Dept: URBAN - METROPOLITAN AREA CLINIC 92 | Facility: CLINIC | Age: 65
End: 2025-02-26

## 2025-02-26 ENCOUNTER — OFFICE VISIT (OUTPATIENT)
Dept: URBAN - METROPOLITAN AREA CLINIC 92 | Facility: CLINIC | Age: 65
End: 2025-02-26
Payer: MEDICARE

## 2025-02-26 VITALS
HEART RATE: 88 BPM | WEIGHT: 187.8 LBS | DIASTOLIC BLOOD PRESSURE: 75 MMHG | BODY MASS INDEX: 28.46 KG/M2 | HEIGHT: 68 IN | SYSTOLIC BLOOD PRESSURE: 126 MMHG | TEMPERATURE: 97.9 F

## 2025-02-26 DIAGNOSIS — R10.84 GENERALIZED ABDOMINAL PAIN: ICD-10-CM

## 2025-02-26 DIAGNOSIS — B18.2 CHRONIC HEPATITIS C: ICD-10-CM

## 2025-02-26 DIAGNOSIS — K21.9 GERD WITHOUT ESOPHAGITIS: ICD-10-CM

## 2025-02-26 DIAGNOSIS — R19.7 DIARRHEA, UNSPECIFIED TYPE: ICD-10-CM

## 2025-02-26 PROBLEM — 266435005: Status: ACTIVE | Noted: 2025-02-26

## 2025-02-26 PROCEDURE — 99213 OFFICE O/P EST LOW 20 MIN: CPT

## 2025-02-26 RX ORDER — CHOLESTYRAMINE 4 G/9G
1 PACKET MIXED WITH WATER OR NON-CARBONATED DRINK POWDER, FOR SUSPENSION ORAL ONCE A DAY
Qty: 30 | Status: DISCONTINUED | COMMUNITY
Start: 2024-12-05

## 2025-02-26 RX ORDER — LISINOPRIL 40 MG/1
1 TABLET TABLET ORAL ONCE A DAY
Status: DISCONTINUED | COMMUNITY

## 2025-02-26 RX ORDER — FINASTERIDE 5 MG/1
1 TABLET TABLET, FILM COATED ORAL ONCE A DAY
Status: ACTIVE | COMMUNITY

## 2025-02-26 RX ORDER — SILDENAFIL 100 MG/1
1 TABLET AS NEEDED TABLET, FILM COATED ORAL ONCE A DAY
Status: DISCONTINUED | COMMUNITY

## 2025-02-26 RX ORDER — OLMESARTAN MEDOXOMIL 40 MG/1
1 TABLET TABLET, FILM COATED ORAL ONCE A DAY
Status: DISCONTINUED | COMMUNITY

## 2025-02-26 RX ORDER — PREGABALIN 75 MG/1
1 CAPSULE CAPSULE ORAL TWICE A DAY
Status: DISCONTINUED | COMMUNITY

## 2025-02-26 RX ORDER — HUMAN INSULIN 100 [IU]/ML
30 UNITS INJECTION, SUSPENSION SUBCUTANEOUS BID
Status: ACTIVE | COMMUNITY

## 2025-02-26 RX ORDER — TAMSULOSIN HYDROCHLORIDE 0.4 MG/1
1 CAPSULE CAPSULE ORAL ONCE A DAY
Status: ACTIVE | COMMUNITY

## 2025-02-26 RX ORDER — GLIMEPIRIDE 4 MG/1
1 TABLET WITH BREAKFAST OR THE FIRST MAIN MEAL OF THE DAY TABLET ORAL ONCE A DAY
Status: DISCONTINUED | COMMUNITY

## 2025-02-26 RX ORDER — HYDROCODONE BITARTRATE AND ACETAMINOPHEN 5; 325 MG/1; MG/1
1 TABLET AS NEEDED TABLET ORAL
Status: ACTIVE | COMMUNITY

## 2025-02-26 RX ORDER — FLUTICASONE FUROATE, UMECLIDINIUM BROMIDE AND VILANTEROL TRIFENATATE 100; 62.5; 25 UG/1; UG/1; UG/1
1 PUFF POWDER RESPIRATORY (INHALATION) ONCE A DAY
Status: ACTIVE | COMMUNITY

## 2025-02-26 RX ORDER — DICYCLOMINE HYDROCHLORIDE 10 MG/1
1 CAPSULE CAPSULE ORAL THREE TIMES A DAY
Qty: 90 CAPSULE | Refills: 1 | OUTPATIENT
Start: 2025-02-26 | End: 2025-04-27

## 2025-02-26 RX ORDER — METOPROLOL TARTRATE 100 MG/1
1 TABLET WITH FOOD TABLET, FILM COATED ORAL TWICE A DAY
Status: ACTIVE | COMMUNITY

## 2025-02-26 RX ORDER — LOPERAMIDE HYDROCHLORIDE 2 MG/1
1 CAPSULE AS NEEDED CAPSULE ORAL
Status: DISCONTINUED | COMMUNITY

## 2025-02-26 RX ORDER — TENOFOVIR ALAFENAMIDE 25 MG/1
TAKE 1 TABLET BY MOUTH ONCE DAILY WITH FOOD TABLET ORAL
Qty: 90 | Refills: 0
End: 2025-05-27

## 2025-02-26 RX ORDER — TENOFOVIR ALAFENAMIDE 25 MG/1
TAKE 1 TABLET BY MOUTH ONCE DAILY WITH FOOD TABLET ORAL
Qty: 90 | Refills: 0 | Status: ACTIVE | COMMUNITY

## 2025-02-26 RX ORDER — PANTOPRAZOLE SODIUM 40 MG/1
1 TABLET TABLET, DELAYED RELEASE ORAL ONCE A DAY
Qty: 90 TABLET | Refills: 3 | Status: ACTIVE | COMMUNITY

## 2025-02-26 RX ORDER — GLIPIZIDE 5 MG/1
1 TABLET 30 MINUTES BEFORE BREAKFAST TABLET ORAL ONCE A DAY
Status: ACTIVE | COMMUNITY

## 2025-02-26 RX ORDER — RIVAROXABAN 20 MG/1
1 TABLET WITH FOOD TABLET, FILM COATED ORAL ONCE A DAY
Status: ACTIVE | COMMUNITY

## 2025-02-26 NOTE — HPI-TODAY'S VISIT:
4/2024 63-year-old male patient presents today for follow-up visit.  He is a patient of the liver center and is currently on Vemlidy due to needing to be on rituximab for RA and scleroderma treatment. He saw Liver center and US 2/15/24 did show hemangioma. MRI was ordered and has not been done yet.  He has longstanding history of reflux issues.  His most recent upper endoscopy was 2/2023 that demonstrated no abnormalities.  He is taking pantoprazole 40 mg daily and since last visit I recommended he increased this to twice a day.  He was also having issues with dysphagia, so I obtained a barium swallow which was negative.  Today he notes that his reflux is much better and his dysphagia has resolved. He did have increased sx in past 2 days and has increased his tobacco smoking. He has no odynophagia, nausea, vomiting, NSAID use. He was complaining of increase bloating, gas in the abdomen. Has gurgling as well. No postprandial fullness. I ordered a SIBO test 2x which he has not done.  He notes alternating bowel movements which he has had for some time. He states he thinks this is due to diet. He has a very poor diet consisting of mainly fast food. He says he has lost any motivation to eat healthy. He saw a dietitian in the past which didn't help. He is noting more loose BM daily. He has around 5-6 BM and can alternate from diarrhea, constipation and normal. Has increased stress in past few weeks so stool is looser. Denies hematochezia or melena.  Last colonoscopy was 6-3-2021 that demonstrated internal hemorrhoids, 2 mm tubular adenoma recommended repeating in 5 years.   He noted increased stress and depression in life lately. He would like to talk to someone about the daily stress he has and does not have much family support. He denies SI/HI.  12/5/24 I last saw patient in April 2024. He followed up with Dr. Stack at the [place] office in July 2024. During this time was complaining of bloating and abdominal discomfort. At this time it was unknown if patient was taking PPI or not so he was told to take pantoprazole daily. He then followed up with Dr. Holden on 9-4-2024 and was complaining of melena, fatigue, diarrhea. At that time he noted he stopped taking Xarelto because he felt too weak. During this time stool studies were obtained that showed no abnormalities, hemoglobin was 15.6, hematocrit 45.6, glucose 160 otherwise normal CMP, CRP 38.8. He was told to start Bentyl and Imodium as needed. He had an EGD on 9- which demonstrated no abnormalities biopsies were positive for H. pylori no treatment has been sent for this. He had a CT on 10- which demonstrated no acute abnormalities, sclerosis involving pedicles at L5 favored to be degenerative to suggest correlation with PSA to better exclude concerning prostate lesion Today he notes he continues to have diarrheaand it is getting worse. Has 7-8 loose BM daily. Sx worse after eating so he is eating less. No nocturnal stool. No BRBPR or melena. Has some abd cramping better after BM. He did not take Imodium as suggested at last visit. He has been taking dicyclomine but he thought this was for his bm.  2/26/25 After last visit patient was given Questran he took this for 2 weeks not sure if it worked  Stool studies were ordered to look at pancreatic elastase which was not done  Colonoscopy 2 - 5 - 25 demonstrated internal hemorrhoids, 2 tubular adenomas biopsies excluded microscopic colitis.  Tells me today he has a discomfort in the abdomen that he cannot explain. He has been on omeprazole 40mg QAM and pepcid 40mg QPM. He is no  longer on dicyclomine and does not know if his pain got better with this  His BM are better and he is having 2 BM daily. Carol Soriano is a 4year old male who was brought in for this visit. History was provided by the mother. HPI:   Patient presents with:  Fever: did not check temp at home, no meds for fever   Sore Throat: x6days     Pt with s/t x 1 week.  Some bod non-tender with no guarding or rebound; no HSM noted; no masses  Skin: No rashes      Results From Past 48 Hours:  No results found for this or any previous visit (from the past 48 hour(s)).     ASSESSMENT/PLAN:   Diagnoses and all orders for this visit:

## 2025-02-27 ENCOUNTER — OFFICE VISIT (OUTPATIENT)
Dept: URBAN - METROPOLITAN AREA CLINIC 86 | Facility: CLINIC | Age: 65
End: 2025-02-27

## 2025-03-05 ENCOUNTER — OFFICE VISIT (OUTPATIENT)
Dept: URBAN - METROPOLITAN AREA CLINIC 86 | Facility: CLINIC | Age: 65
End: 2025-03-05
Payer: MEDICARE

## 2025-03-05 ENCOUNTER — LAB OUTSIDE AN ENCOUNTER (OUTPATIENT)
Dept: URBAN - METROPOLITAN AREA CLINIC 86 | Facility: CLINIC | Age: 65
End: 2025-03-05

## 2025-03-05 VITALS
SYSTOLIC BLOOD PRESSURE: 129 MMHG | HEIGHT: 68 IN | HEART RATE: 78 BPM | WEIGHT: 187 LBS | DIASTOLIC BLOOD PRESSURE: 87 MMHG | TEMPERATURE: 97.3 F | RESPIRATION RATE: 97 BRPM | BODY MASS INDEX: 28.34 KG/M2

## 2025-03-05 DIAGNOSIS — B18.2 CHRONIC HEPATITIS C: ICD-10-CM

## 2025-03-05 DIAGNOSIS — R10.9 ABDOMINAL PAIN, UNSPECIFIED ABDOMINAL LOCATION: ICD-10-CM

## 2025-03-05 DIAGNOSIS — K76.9 LIVER LESION: ICD-10-CM

## 2025-03-05 DIAGNOSIS — R76.8 HEPATITIS B CORE ANTIBODY POSITIVE: ICD-10-CM

## 2025-03-05 DIAGNOSIS — R10.13 EPIGASTRIC PAIN: ICD-10-CM

## 2025-03-05 DIAGNOSIS — M34.9 SCLERODERMA: ICD-10-CM

## 2025-03-05 DIAGNOSIS — Z71.89 VACCINE COUNSELING: ICD-10-CM

## 2025-03-05 DIAGNOSIS — M06.9 RHEUMATOID ARTHRITIS: ICD-10-CM

## 2025-03-05 DIAGNOSIS — Z79.899 HIGH RISK MEDICATION USE: ICD-10-CM

## 2025-03-05 DIAGNOSIS — R63.4 WEIGHT LOSS: ICD-10-CM

## 2025-03-05 DIAGNOSIS — R89.4 HEPATITIS B CORE ANTIBODY POSITIVE: ICD-10-CM

## 2025-03-05 PROCEDURE — 99214 OFFICE O/P EST MOD 30 MIN: CPT | Performed by: PHYSICIAN ASSISTANT

## 2025-03-05 RX ORDER — DICYCLOMINE HYDROCHLORIDE 10 MG/1
1 CAPSULE CAPSULE ORAL THREE TIMES A DAY
Qty: 90 CAPSULE | Refills: 1 | Status: ACTIVE | COMMUNITY
Start: 2025-02-26 | End: 2025-04-27

## 2025-03-05 RX ORDER — TAMSULOSIN HYDROCHLORIDE 0.4 MG/1
1 CAPSULE CAPSULE ORAL ONCE A DAY
Status: ACTIVE | COMMUNITY

## 2025-03-05 RX ORDER — TENOFOVIR ALAFENAMIDE 25 MG/1
TAKE 1 TABLET BY MOUTH ONCE DAILY WITH FOOD TABLET ORAL
Qty: 90 | Refills: 0

## 2025-03-05 RX ORDER — PANTOPRAZOLE SODIUM 40 MG/1
1 TABLET TABLET, DELAYED RELEASE ORAL ONCE A DAY
Qty: 90 TABLET | Refills: 3 | Status: ACTIVE | COMMUNITY

## 2025-03-05 RX ORDER — HUMAN INSULIN 100 [IU]/ML
30 UNITS INJECTION, SUSPENSION SUBCUTANEOUS BID
Status: ACTIVE | COMMUNITY

## 2025-03-05 RX ORDER — FLUTICASONE FUROATE, UMECLIDINIUM BROMIDE AND VILANTEROL TRIFENATATE 100; 62.5; 25 UG/1; UG/1; UG/1
1 PUFF POWDER RESPIRATORY (INHALATION) ONCE A DAY
Status: ACTIVE | COMMUNITY

## 2025-03-05 RX ORDER — TENOFOVIR ALAFENAMIDE 25 MG/1
TAKE 1 TABLET BY MOUTH ONCE DAILY WITH FOOD TABLET ORAL
Qty: 90 | Refills: 0 | Status: ACTIVE | COMMUNITY
End: 2025-05-27

## 2025-03-05 RX ORDER — FINASTERIDE 5 MG/1
1 TABLET TABLET, FILM COATED ORAL ONCE A DAY
Status: ACTIVE | COMMUNITY

## 2025-03-05 RX ORDER — RIVAROXABAN 20 MG/1
1 TABLET WITH FOOD TABLET, FILM COATED ORAL ONCE A DAY
Status: ACTIVE | COMMUNITY

## 2025-03-05 RX ORDER — METOPROLOL TARTRATE 100 MG/1
1 TABLET WITH FOOD TABLET, FILM COATED ORAL TWICE A DAY
Status: ACTIVE | COMMUNITY

## 2025-03-05 RX ORDER — HYDROCODONE BITARTRATE AND ACETAMINOPHEN 5; 325 MG/1; MG/1
1 TABLET AS NEEDED TABLET ORAL
Status: ACTIVE | COMMUNITY

## 2025-03-05 RX ORDER — GLIPIZIDE 5 MG/1
1 TABLET 30 MINUTES BEFORE BREAKFAST TABLET ORAL ONCE A DAY
Status: ACTIVE | COMMUNITY
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
## 2025-04-28 ENCOUNTER — OFFICE VISIT (OUTPATIENT)
Dept: URBAN - METROPOLITAN AREA CLINIC 92 | Facility: CLINIC | Age: 65
End: 2025-04-28
Payer: COMMERCIAL

## 2025-04-28 DIAGNOSIS — K21.9 GERD WITHOUT ESOPHAGITIS: ICD-10-CM

## 2025-04-28 DIAGNOSIS — B18.2 CHRONIC HEPATITIS C: ICD-10-CM

## 2025-04-28 DIAGNOSIS — R10.84 GENERALIZED ABDOMINAL PAIN: ICD-10-CM

## 2025-04-28 PROCEDURE — 99214 OFFICE O/P EST MOD 30 MIN: CPT

## 2025-04-28 RX ORDER — TENOFOVIR ALAFENAMIDE 25 MG/1
TAKE 1 TABLET BY MOUTH ONCE DAILY WITH FOOD TABLET ORAL
Qty: 90 | Refills: 0 | Status: ACTIVE | COMMUNITY

## 2025-04-28 RX ORDER — METOPROLOL TARTRATE 100 MG/1
1 TABLET WITH FOOD TABLET, FILM COATED ORAL TWICE A DAY
Status: ACTIVE | COMMUNITY

## 2025-04-28 RX ORDER — RIVAROXABAN 20 MG/1
1 TABLET WITH FOOD TABLET, FILM COATED ORAL ONCE A DAY
Status: ACTIVE | COMMUNITY

## 2025-04-28 RX ORDER — FINASTERIDE 5 MG/1
1 TABLET TABLET, FILM COATED ORAL ONCE A DAY
Status: ACTIVE | COMMUNITY

## 2025-04-28 RX ORDER — PANTOPRAZOLE SODIUM 40 MG/1
1 TABLET 1/2 TO 1 HOUR BEFORE MORNING MEAL TABLET, DELAYED RELEASE ORAL ONCE A DAY
Qty: 90 TABLET | Refills: 3

## 2025-04-28 RX ORDER — PANTOPRAZOLE SODIUM 40 MG/1
1 TABLET TABLET, DELAYED RELEASE ORAL ONCE A DAY
Qty: 90 TABLET | Refills: 3 | Status: ACTIVE | COMMUNITY

## 2025-04-28 RX ORDER — HYDROCODONE BITARTRATE AND ACETAMINOPHEN 5; 325 MG/1; MG/1
1 TABLET AS NEEDED TABLET ORAL
Status: ACTIVE | COMMUNITY

## 2025-04-28 RX ORDER — FLUTICASONE FUROATE, UMECLIDINIUM BROMIDE AND VILANTEROL TRIFENATATE 100; 62.5; 25 UG/1; UG/1; UG/1
1 PUFF POWDER RESPIRATORY (INHALATION) ONCE A DAY
Status: ACTIVE | COMMUNITY

## 2025-04-28 RX ORDER — GLIPIZIDE 5 MG/1
1 TABLET 30 MINUTES BEFORE BREAKFAST TABLET ORAL ONCE A DAY
Status: ACTIVE | COMMUNITY

## 2025-04-28 RX ORDER — HUMAN INSULIN 100 [IU]/ML
30 UNITS INJECTION, SUSPENSION SUBCUTANEOUS BID
Status: ACTIVE | COMMUNITY

## 2025-04-28 RX ORDER — TAMSULOSIN HYDROCHLORIDE 0.4 MG/1
1 CAPSULE CAPSULE ORAL ONCE A DAY
Status: ACTIVE | COMMUNITY

## 2025-04-28 NOTE — HPI-TODAY'S VISIT:
4/2024 63-year-old male patient presents today for follow-up visit.  He is a patient of the liver center and is currently on Vemlidy due to needing to be on rituximab for RA and scleroderma treatment. He saw Liver center and US 2/15/24 did show hemangioma. MRI was ordered and has not been done yet.  He has longstanding history of reflux issues.  His most recent upper endoscopy was 2/2023 that demonstrated no abnormalities.  He is taking pantoprazole 40 mg daily and since last visit I recommended he increased this to twice a day.  He was also having issues with dysphagia, so I obtained a barium swallow which was negative.  Today he notes that his reflux is much better and his dysphagia has resolved. He did have increased sx in past 2 days and has increased his tobacco smoking. He has no odynophagia, nausea, vomiting, NSAID use. He was complaining of increase bloating, gas in the abdomen. Has gurgling as well. No postprandial fullness. I ordered a SIBO test 2x which he has not done.  He notes alternating bowel movements which he has had for some time. He states he thinks this is due to diet. He has a very poor diet consisting of mainly fast food. He says he has lost any motivation to eat healthy. He saw a dietitian in the past which didn't help. He is noting more loose BM daily. He has around 5-6 BM and can alternate from diarrhea, constipation and normal. Has increased stress in past few weeks so stool is looser. Denies hematochezia or melena.  Last colonoscopy was 6-3-2021 that demonstrated internal hemorrhoids, 2 mm tubular adenoma recommended repeating in 5 years.   He noted increased stress and depression in life lately. He would like to talk to someone about the daily stress he has and does not have much family support. He denies SI/HI.  12/5/24 I last saw patient in April 2024. He followed up with Dr. Stack at the [place] office in July 2024. During this time was complaining of bloating and abdominal discomfort. At this time it was unknown if patient was taking PPI or not so he was told to take pantoprazole daily. He then followed up with Dr. Holden on 9-4-2024 and was complaining of melena, fatigue, diarrhea. At that time he noted he stopped taking Xarelto because he felt too weak. During this time stool studies were obtained that showed no abnormalities, hemoglobin was 15.6, hematocrit 45.6, glucose 160 otherwise normal CMP, CRP 38.8. He was told to start Bentyl and Imodium as needed. He had an EGD on 9- which demonstrated no abnormalities biopsies were positive for H. pylori no treatment has been sent for this. He had a CT on 10- which demonstrated no acute abnormalities, sclerosis involving pedicles at L5 favored to be degenerative to suggest correlation with PSA to better exclude concerning prostate lesion Today he notes he continues to have diarrheaand it is getting worse. Has 7-8 loose BM daily. Sx worse after eating so he is eating less. No nocturnal stool. No BRBPR or melena. Has some abd cramping better after BM. He did not take Imodium as suggested at last visit. He has been taking dicyclomine but he thought this was for his bm.  2/26/25 After last visit patient was given Questran he took this for 2 weeks not sure if it worked  Stool studies were ordered to look at pancreatic elastase which was not done  Colonoscopy 2 - 5 - 25 demonstrated internal hemorrhoids, 2 tubular adenomas biopsies excluded microscopic colitis.  Tells me today he has a discomfort in the abdomen that he cannot explain. He has been on omeprazole 40mg QAM and pepcid 40mg QPM. He is no  longer on dicyclomine and does not know if his pain got better with this  His BM are better and he is having 2 BM daily.  4/28/25 After last visit stool studies were reordered however still not completed.  He was told to start dicyclomine to see if this helped with his cramping he did follow-up with Kat the liver center.  Has  TURP on April 8th has been having some stomach issues since. Feels like stomach has gotten "harder". He still has not tried dicyclomine. BM are better. No BRBPR or melena. He has not had his MRI. Last GES was in 2020 and normal. Notes some anxiety thinks this might be contributing to her issues. Has meds at home but never took them.

## 2025-05-08 ENCOUNTER — TELEPHONE ENCOUNTER (OUTPATIENT)
Dept: URBAN - METROPOLITAN AREA CLINIC 92 | Facility: CLINIC | Age: 65
End: 2025-05-08

## 2025-05-08 RX ORDER — TENOFOVIR ALAFENAMIDE 25 MG/1
TAKE 1 TABLET BY MOUTH ONCE DAILY WITH FOOD TABLET ORAL
Qty: 90 | Refills: 0
End: 2025-08-06

## 2025-05-16 ENCOUNTER — TELEPHONE ENCOUNTER (OUTPATIENT)
Dept: URBAN - METROPOLITAN AREA CLINIC 86 | Facility: CLINIC | Age: 65
End: 2025-05-16

## 2025-05-16 NOTE — HPI-TODAY'S VISIT:
Selvin Devries, The recent MRI was sent to me.  The liver appeared normal without fat or iron.  They did see a small area that they thought was a hemangioma and another area they were not sure what they saw.  They said it was seen on the last exam as well.  They did not see anything suspicious.  Gallbladder status post cholecystectomy.  Spleen and pancreas were normal.  The hepatic vasculature appeared patent.  Overall the liver had a normal morphology and they recommend we check on the area of arterial enhancement and 3 to 6 months.  I will go ahead and put an order and and would recommend we do this in 4 months.  Will review this at your visit. Kat Garg PA-C

## 2025-05-28 ENCOUNTER — TELEPHONE ENCOUNTER (OUTPATIENT)
Dept: URBAN - METROPOLITAN AREA CLINIC 92 | Facility: CLINIC | Age: 65
End: 2025-05-28

## 2025-05-28 RX ORDER — ONDANSETRON 8 MG/1
1 TABLET ON THE TONGUE AND ALLOW TO DISSOLVE TABLET, ORALLY DISINTEGRATING ORAL
Qty: 90 TABLET | Refills: 1 | OUTPATIENT
Start: 2025-05-28

## 2025-05-28 RX ORDER — HYOSCYAMINE SULFATE 0.12 MG/1
1 TABLET ON THE TONGUE AND ALLOW TO DISSOLVE TABLET, ORALLY DISINTEGRATING ORAL
Qty: 90 TABLET | Refills: 1 | OUTPATIENT
Start: 2025-05-28

## 2025-06-11 ENCOUNTER — OFFICE VISIT (OUTPATIENT)
Dept: URBAN - METROPOLITAN AREA CLINIC 92 | Facility: CLINIC | Age: 65
End: 2025-06-11
Payer: COMMERCIAL

## 2025-06-11 ENCOUNTER — LAB OUTSIDE AN ENCOUNTER (OUTPATIENT)
Dept: URBAN - METROPOLITAN AREA CLINIC 92 | Facility: CLINIC | Age: 65
End: 2025-06-11

## 2025-06-11 DIAGNOSIS — K21.9 GASTROESOPHAGEAL REFLUX DISEASE WITHOUT ESOPHAGITIS: ICD-10-CM

## 2025-06-11 DIAGNOSIS — R11.2 NAUSEA AND VOMITING, UNSPECIFIED VOMITING TYPE: ICD-10-CM

## 2025-06-11 DIAGNOSIS — K22.0 ACHALASIA: ICD-10-CM

## 2025-06-11 PROBLEM — 16932000: Status: ACTIVE | Noted: 2025-06-11

## 2025-06-11 PROBLEM — 45564002: Status: ACTIVE | Noted: 2025-06-11

## 2025-06-11 PROCEDURE — 99214 OFFICE O/P EST MOD 30 MIN: CPT | Performed by: STUDENT IN AN ORGANIZED HEALTH CARE EDUCATION/TRAINING PROGRAM

## 2025-06-11 RX ORDER — PANTOPRAZOLE SODIUM 40 MG/1
1 TABLET 1/2 TO 1 HOUR BEFORE MORNING MEAL TABLET, DELAYED RELEASE ORAL ONCE A DAY
Qty: 90 TABLET | Refills: 3 | Status: ACTIVE | COMMUNITY

## 2025-06-11 RX ORDER — METOPROLOL TARTRATE 100 MG/1
1 TABLET WITH FOOD TABLET, FILM COATED ORAL TWICE A DAY
Status: ACTIVE | COMMUNITY

## 2025-06-11 RX ORDER — HYDROCODONE BITARTRATE AND ACETAMINOPHEN 5; 325 MG/1; MG/1
1 TABLET AS NEEDED TABLET ORAL
Status: ACTIVE | COMMUNITY

## 2025-06-11 RX ORDER — HYOSCYAMINE SULFATE 0.12 MG/1
1 TABLET ON THE TONGUE AND ALLOW TO DISSOLVE TABLET, ORALLY DISINTEGRATING ORAL
Qty: 90 TABLET | Refills: 1 | Status: DISCONTINUED | COMMUNITY
Start: 2025-05-28

## 2025-06-11 RX ORDER — GLIPIZIDE 5 MG/1
1 TABLET 30 MINUTES BEFORE BREAKFAST TABLET ORAL ONCE A DAY
Status: DISCONTINUED | COMMUNITY

## 2025-06-11 RX ORDER — HUMAN INSULIN 100 [IU]/ML
30 UNITS INJECTION, SUSPENSION SUBCUTANEOUS BID
Status: ACTIVE | COMMUNITY

## 2025-06-11 RX ORDER — ONDANSETRON 8 MG/1
1 TABLET ON THE TONGUE AND ALLOW TO DISSOLVE TABLET, ORALLY DISINTEGRATING ORAL
Qty: 90 TABLET | Refills: 1 | Status: ACTIVE | COMMUNITY
Start: 2025-05-28

## 2025-06-11 RX ORDER — RIVAROXABAN 20 MG/1
1 TABLET WITH FOOD TABLET, FILM COATED ORAL ONCE A DAY
Status: ACTIVE | COMMUNITY

## 2025-06-11 RX ORDER — FLUTICASONE FUROATE, UMECLIDINIUM BROMIDE AND VILANTEROL TRIFENATATE 100; 62.5; 25 UG/1; UG/1; UG/1
1 PUFF POWDER RESPIRATORY (INHALATION) ONCE A DAY
Status: ACTIVE | COMMUNITY

## 2025-06-11 RX ORDER — FINASTERIDE 5 MG/1
1 TABLET TABLET, FILM COATED ORAL ONCE A DAY
Status: ACTIVE | COMMUNITY

## 2025-06-11 RX ORDER — TAMSULOSIN HYDROCHLORIDE 0.4 MG/1
1 CAPSULE CAPSULE ORAL ONCE A DAY
Status: ACTIVE | COMMUNITY

## 2025-06-11 RX ORDER — OMEPRAZOLE 40 MG/1
1 CAPSULE 30 MINUTES BEFORE FIRST AND LAST MEALS CAPSULE, DELAYED RELEASE ORAL BID
Qty: 60 | Refills: 2 | OUTPATIENT
Start: 2025-06-11

## 2025-06-11 RX ORDER — TENOFOVIR ALAFENAMIDE 25 MG/1
TAKE 1 TABLET BY MOUTH ONCE DAILY WITH FOOD TABLET ORAL
Qty: 90 | Refills: 0 | Status: ACTIVE | COMMUNITY
End: 2025-08-06

## 2025-06-11 NOTE — HPI-TODAY'S VISIT:
05/2025  63-year-old male patient presents today for follow-up visit for worsening epigastric pain, nausea and vomiting.. He is a patient of the liver center and is currently on Vemlidy due to needing to be on rituximab for RA and scleroderma treatment.   Previous history:   He has longstanding history of reflux issues. His most recent upper endoscopy was 2/2023 that demonstrated no abnormalities. He is taking pantoprazole 40 mg daily and since last visit I recommended he increased this to twice a day. He was also having issues with dysphagia, so I obtained a barium swallow which was negative. Today he notes that his reflux is much better and his dysphagia has resolved. He did have increased sx in past 2 days and has increased his tobacco smoking. He has no odynophagia, nausea, vomiting, NSAID use. He was complaining of increase bloating, gas in the abdomen. Has gurgling as well. No postprandial fullness. I ordered a SIBO test 2x which he has not done.  He notes alternating bowel movements which he has had for some time. He states he thinks this is due to diet. He has a very poor diet consisting of mainly fast food. He says he has lost any motivation to eat healthy. He saw a dietitian in the past which didn't help. He is noting more loose BM daily. He has around 5-6 BM and can alternate from diarrhea, constipation and normal. Has increased stress in past few weeks so stool is looser. Denies hematochezia or melena. Last colonoscopy was 6-3-2021 that demonstrated internal hemorrhoids, 2 mm tubular adenoma recommended repeating in 5 years.  He noted increased stress and depression in life lately. He would like to talk to someone about the daily stress he has and does not have much family support. He denies SI/HI.  12/5/24 I last saw patient in April 2024. He followed up with Dr. Stack at the [place] office in July 2024. During this time was complaining of bloating and abdominal discomfort. At this time it was unknown if patient was taking PPI or not so he was told to take pantoprazole daily. He then followed up with Dr. Holden on 9-4-2024 and was complaining of melena, fatigue, diarrhea. At that time he noted he stopped taking Xarelto because he felt too weak. During this time stool studies were obtained that showed no abnormalities, hemoglobin was 15.6, hematocrit 45.6, glucose 160 otherwise normal CMP, CRP 38.8. He was told to start Bentyl and Imodium as needed. He had an EGD on 9- which demonstrated no abnormalities biopsies were positive for H. pylori no treatment has been sent for this. He had a CT on 10- which demonstrated no acute abnormalities, sclerosis involving pedicles at L5 favored to be degenerative to suggest correlation with PSA to better exclude concerning prostate lesion Today he notes he continues to have diarrheaand it is getting worse. Has 7-8 loose BM daily. Sx worse after eating so he is eating less. No nocturnal stool. No BRBPR or melena. Has some abd cramping better after BM. He did not take Imodium as suggested at last visit. He has been taking dicyclomine but he thought this was for his bm.  2/26/25 After last visit patient was given Questran he took this for 2 weeks not sure if it worked Stool studies were ordered to look at pancreatic elastase which was not done Colonoscopy 2 - 5 - 25 demonstrated internal hemorrhoids, 2 tubular adenomas biopsies excluded microscopic colitis. Tells me today he has a discomfort in the abdomen that he cannot explain. He has been on omeprazole 40mg QAM and pepcid 40mg QPM. He is no longer on dicyclomine and does not know if his pain got better with this His BM are better and he is having 2 BM daily.  4/28/25 After last visit stool studies were reordered however still not completed. He was told to start dicyclomine to see if this helped with his cramping he did follow-up with Kat the liver center. Has TURP on April 8th has been having some stomach issues since. Feels like stomach has gotten "harder". He still has not tried dicyclomine. BM are better. No BRBPR or melena. He has not had his MRI. Last GES was in 2020 and normal. Notes some anxiety thinks this might be contributing to her issues. Has meds at home but never took them.

## 2025-06-17 ENCOUNTER — OFFICE VISIT (OUTPATIENT)
Dept: URBAN - METROPOLITAN AREA CLINIC 92 | Facility: CLINIC | Age: 65
End: 2025-06-17

## 2025-06-25 ENCOUNTER — OFFICE VISIT (OUTPATIENT)
Dept: URBAN - METROPOLITAN AREA MEDICAL CENTER 12 | Facility: MEDICAL CENTER | Age: 65
End: 2025-06-25

## 2025-07-03 ENCOUNTER — OFFICE VISIT (OUTPATIENT)
Dept: URBAN - METROPOLITAN AREA CLINIC 86 | Facility: CLINIC | Age: 65
End: 2025-07-03
Payer: COMMERCIAL

## 2025-07-03 ENCOUNTER — OFFICE VISIT (OUTPATIENT)
Dept: URBAN - METROPOLITAN AREA CLINIC 86 | Facility: CLINIC | Age: 65
End: 2025-07-03

## 2025-07-03 DIAGNOSIS — R10.13 EPIGASTRIC PAIN: ICD-10-CM

## 2025-07-03 DIAGNOSIS — R63.4 WEIGHT LOSS: ICD-10-CM

## 2025-07-03 DIAGNOSIS — R10.9 ABDOMINAL PAIN, UNSPECIFIED ABDOMINAL LOCATION: ICD-10-CM

## 2025-07-03 DIAGNOSIS — Z71.89 VACCINE COUNSELING: ICD-10-CM

## 2025-07-03 DIAGNOSIS — M06.9 RHEUMATOID ARTHRITIS: ICD-10-CM

## 2025-07-03 DIAGNOSIS — R89.4 HEPATITIS B CORE ANTIBODY POSITIVE: ICD-10-CM

## 2025-07-03 DIAGNOSIS — Z79.899 HIGH RISK MEDICATION USE: ICD-10-CM

## 2025-07-03 DIAGNOSIS — R76.8 HEPATITIS B CORE ANTIBODY POSITIVE: ICD-10-CM

## 2025-07-03 DIAGNOSIS — K76.9 LIVER LESION: ICD-10-CM

## 2025-07-03 DIAGNOSIS — M34.9 SCLERODERMA: ICD-10-CM

## 2025-07-03 DIAGNOSIS — B18.2 CHRONIC HEPATITIS C: ICD-10-CM

## 2025-07-03 PROCEDURE — 99214 OFFICE O/P EST MOD 30 MIN: CPT | Performed by: PHYSICIAN ASSISTANT

## 2025-07-03 RX ORDER — RIVAROXABAN 20 MG/1
1 TABLET WITH FOOD TABLET, FILM COATED ORAL ONCE A DAY
Status: ACTIVE | COMMUNITY

## 2025-07-03 RX ORDER — ONDANSETRON 8 MG/1
1 TABLET ON THE TONGUE AND ALLOW TO DISSOLVE TABLET, ORALLY DISINTEGRATING ORAL
Qty: 90 TABLET | Refills: 1 | Status: ACTIVE | COMMUNITY
Start: 2025-05-28

## 2025-07-03 RX ORDER — TENOFOVIR ALAFENAMIDE 25 MG/1
TAKE 1 TABLET BY MOUTH ONCE DAILY WITH FOOD TABLET ORAL
Qty: 90 | Refills: 0 | Status: ACTIVE | COMMUNITY
End: 2025-08-06

## 2025-07-03 RX ORDER — OMEPRAZOLE 40 MG/1
1 CAPSULE 30 MINUTES BEFORE FIRST AND LAST MEALS CAPSULE, DELAYED RELEASE ORAL BID
Qty: 60 | Refills: 2 | Status: ACTIVE | COMMUNITY
Start: 2025-06-11

## 2025-07-03 RX ORDER — FINASTERIDE 5 MG/1
1 TABLET TABLET, FILM COATED ORAL ONCE A DAY
Status: ACTIVE | COMMUNITY

## 2025-07-03 RX ORDER — FLUTICASONE FUROATE, UMECLIDINIUM BROMIDE AND VILANTEROL TRIFENATATE 100; 62.5; 25 UG/1; UG/1; UG/1
1 PUFF POWDER RESPIRATORY (INHALATION) ONCE A DAY
Status: ACTIVE | COMMUNITY

## 2025-07-03 RX ORDER — TAMSULOSIN HYDROCHLORIDE 0.4 MG/1
1 CAPSULE CAPSULE ORAL ONCE A DAY
Status: ACTIVE | COMMUNITY

## 2025-07-03 RX ORDER — TENOFOVIR ALAFENAMIDE 25 MG/1
TAKE 1 TABLET BY MOUTH ONCE DAILY WITH FOOD TABLET ORAL
Qty: 90 | Refills: 1
Start: 2025-07-03 | End: 2025-12-30

## 2025-07-03 RX ORDER — METOPROLOL TARTRATE 100 MG/1
1 TABLET WITH FOOD TABLET, FILM COATED ORAL TWICE A DAY
Status: ACTIVE | COMMUNITY

## 2025-07-03 RX ORDER — HYDROCODONE BITARTRATE AND ACETAMINOPHEN 5; 325 MG/1; MG/1
1 TABLET AS NEEDED TABLET ORAL
Status: ACTIVE | COMMUNITY

## 2025-07-03 RX ORDER — PANTOPRAZOLE SODIUM 40 MG/1
1 TABLET 1/2 TO 1 HOUR BEFORE MORNING MEAL TABLET, DELAYED RELEASE ORAL ONCE A DAY
Qty: 90 TABLET | Refills: 3 | Status: ACTIVE | COMMUNITY

## 2025-07-03 RX ORDER — HUMAN INSULIN 100 [IU]/ML
30 UNITS INJECTION, SUSPENSION SUBCUTANEOUS BID
Status: ACTIVE | COMMUNITY
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
## 2025-07-08 ENCOUNTER — TELEPHONE ENCOUNTER (OUTPATIENT)
Dept: URBAN - METROPOLITAN AREA CLINIC 86 | Facility: CLINIC | Age: 65
End: 2025-07-08

## 2025-07-31 ENCOUNTER — TELEPHONE ENCOUNTER (OUTPATIENT)
Dept: URBAN - METROPOLITAN AREA CLINIC 92 | Facility: CLINIC | Age: 65
End: 2025-07-31

## 2025-08-29 ENCOUNTER — OFFICE VISIT (OUTPATIENT)
Dept: URBAN - METROPOLITAN AREA MEDICAL CENTER 12 | Facility: MEDICAL CENTER | Age: 65
End: 2025-08-29

## 2025-08-29 RX ORDER — FLUTICASONE FUROATE, UMECLIDINIUM BROMIDE AND VILANTEROL TRIFENATATE 100; 62.5; 25 UG/1; UG/1; UG/1
1 PUFF POWDER RESPIRATORY (INHALATION) ONCE A DAY
Status: ACTIVE | COMMUNITY

## 2025-08-29 RX ORDER — METOPROLOL TARTRATE 100 MG/1
1 TABLET WITH FOOD TABLET, FILM COATED ORAL TWICE A DAY
Status: ACTIVE | COMMUNITY

## 2025-08-29 RX ORDER — TENOFOVIR ALAFENAMIDE 25 MG/1
TAKE 1 TABLET BY MOUTH ONCE DAILY WITH FOOD TABLET ORAL
Qty: 90 | Refills: 1 | Status: ACTIVE | COMMUNITY
Start: 2025-07-03 | End: 2025-12-30

## 2025-08-29 RX ORDER — FINASTERIDE 5 MG/1
1 TABLET TABLET, FILM COATED ORAL ONCE A DAY
Status: ACTIVE | COMMUNITY

## 2025-08-29 RX ORDER — OMEPRAZOLE 40 MG/1
1 CAPSULE 30 MINUTES BEFORE FIRST AND LAST MEALS CAPSULE, DELAYED RELEASE ORAL BID
Qty: 60 | Refills: 2 | Status: ACTIVE | COMMUNITY
Start: 2025-06-11

## 2025-08-29 RX ORDER — HUMAN INSULIN 100 [IU]/ML
30 UNITS INJECTION, SUSPENSION SUBCUTANEOUS BID
Status: ACTIVE | COMMUNITY

## 2025-08-29 RX ORDER — TAMSULOSIN HYDROCHLORIDE 0.4 MG/1
1 CAPSULE CAPSULE ORAL ONCE A DAY
Status: ACTIVE | COMMUNITY

## 2025-08-29 RX ORDER — HYDROCODONE BITARTRATE AND ACETAMINOPHEN 5; 325 MG/1; MG/1
1 TABLET AS NEEDED TABLET ORAL
Status: ACTIVE | COMMUNITY

## 2025-08-29 RX ORDER — RIVAROXABAN 20 MG/1
1 TABLET WITH FOOD TABLET, FILM COATED ORAL ONCE A DAY
Status: ACTIVE | COMMUNITY

## 2025-08-29 RX ORDER — PANTOPRAZOLE SODIUM 40 MG/1
1 TABLET 1/2 TO 1 HOUR BEFORE MORNING MEAL TABLET, DELAYED RELEASE ORAL ONCE A DAY
Qty: 90 TABLET | Refills: 3 | Status: ACTIVE | COMMUNITY

## 2025-08-29 RX ORDER — ONDANSETRON 8 MG/1
1 TABLET ON THE TONGUE AND ALLOW TO DISSOLVE TABLET, ORALLY DISINTEGRATING ORAL
Qty: 90 TABLET | Refills: 1 | Status: ACTIVE | COMMUNITY
Start: 2025-05-28